# Patient Record
Sex: MALE | Race: WHITE | Employment: FULL TIME | URBAN - METROPOLITAN AREA
[De-identification: names, ages, dates, MRNs, and addresses within clinical notes are randomized per-mention and may not be internally consistent; named-entity substitution may affect disease eponyms.]

---

## 2017-01-09 ENCOUNTER — TELEPHONE (OUTPATIENT)
Dept: FAMILY MEDICINE | Facility: CLINIC | Age: 42
End: 2017-01-09

## 2017-01-09 DIAGNOSIS — Z20.828 EXPOSURE TO THE FLU: Primary | ICD-10-CM

## 2017-01-09 RX ORDER — OSELTAMIVIR PHOSPHATE 75 MG/1
75 CAPSULE ORAL DAILY
Qty: 10 CAPSULE | Refills: 0 | Status: SHIPPED | OUTPATIENT
Start: 2017-01-09 | End: 2017-02-13

## 2017-01-09 NOTE — TELEPHONE ENCOUNTER
Patient's daughter was in clinic and was diagnosed with influenza. Per Dr. Singer ok to treat with tamiflu.  Script ordered.  Rosa Lim RN

## 2017-02-08 ENCOUNTER — HOSPITAL ENCOUNTER (EMERGENCY)
Facility: CLINIC | Age: 42
Discharge: HOME | End: 2017-02-08
Attending: EMERGENCY MEDICINE | Admitting: EMERGENCY MEDICINE
Payer: COMMERCIAL

## 2017-02-08 VITALS — OXYGEN SATURATION: 98 % | DIASTOLIC BLOOD PRESSURE: 98 MMHG | SYSTOLIC BLOOD PRESSURE: 166 MMHG | TEMPERATURE: 99.8 F

## 2017-02-08 DIAGNOSIS — M79.661 RIGHT CALF PAIN: ICD-10-CM

## 2017-02-08 PROCEDURE — 99283 EMERGENCY DEPT VISIT LOW MDM: CPT | Mod: 25 | Performed by: EMERGENCY MEDICINE

## 2017-02-08 PROCEDURE — 76882 US LMTD JT/FCL EVL NVASC XTR: CPT | Performed by: EMERGENCY MEDICINE

## 2017-02-08 PROCEDURE — 76882 US LMTD JT/FCL EVL NVASC XTR: CPT | Mod: 26 | Performed by: EMERGENCY MEDICINE

## 2017-02-08 PROCEDURE — 99284 EMERGENCY DEPT VISIT MOD MDM: CPT | Mod: 25 | Performed by: EMERGENCY MEDICINE

## 2017-02-08 NOTE — ED AVS SNAPSHOT
[facility] Emergency Department    [address] [address]    Phone:  [phone]    Fax:  [phone]                                       Carlo Valdez   MRN: [id number]    Department:  [facility] Emergency Department   Date of Visit:  2/8/2017           Patient Information     Date Of Birth          1975        Your diagnoses for this visit were:     Right calf pain        You were seen by Lewis Esteves MD.        Discharge Instructions       Take acetaminophen or ibuprofen as needed for the pain.  Use rest, ice, compression, and elevation to help with the swelling.  Start range of motion exercises in the next 24-48 hours.  If your pain becomes suddenly much worse or if there is change, please return to the emergency department for recheck.  Otherwise follow-up in clinic if your pain is not improved in 5 or 6 days.    24 Hour Appointment Hotline       To make an appointment at any [facility], call [phone] ([phone]). If you don't have a family doctor or clinic, we will help you find one. [facility] clinics are conveniently located to serve the needs of you and your family.             Review of your medicines      Our records show that you are taking the medicines listed below. If these are incorrect, please call your family doctor or clinic.        Dose / Directions Last dose taken    cephALEXin 500 MG capsule   Commonly known as:  KEFLEX   Dose:  500 mg   Quantity:  40 capsule        Take 1 capsule (500 mg) by mouth 4 times daily   Refills:  0        Multi-vitamin Tabs tablet   Dose:  1 tablet   Generic drug:  multivitamin, therapeutic with minerals        Take 1 tablet by mouth daily.   Refills:  0        NO ACTIVE MEDICATIONS        .   Refills:  0        oseltamivir 75 MG capsule   Commonly known as:  TAMIFLU   Dose:  75 mg   Quantity:  10 capsule        Take 1 capsule (75 mg) by mouth daily   Refills:  0                Procedures and tests  "performed during your visit     POC US SOFT TISSUE      Orders Needing Specimen Collection     None      Pending Results     No orders found from 2017 to 2017.            Pending Culture Results     No orders found from 2017 to 2017.       Test Results from your hospital stay           2017  9:27 PM - Lewis Esteves MD      [facility] Procedure Note     Limited Bedside ED Ultrasound of Soft Tissue:    PROCEDURE: PERFORMED BY: Dr. Lewis Esteves  INDICATIONS/SYMPTOM: Calf pain  PROBE: High frequency linear probe  BODY LOCATION: Soft tissue located on right calf and achilles     FINDINGS: No defect seen within the achilles tendon of the right  lower leg  INTERPRETATION:  The soft tissue and muscle layers were  evaluated.      Findings indicate no sign of achilles tendon rupture    IMAGE DOCUMENTATION: Images were archived to PACs system.                Thank you for choosing [facility]       Thank you for choosing [facility] for your care. Our goal is always to provide you with excellent care. Hearing back from our patients is one way we can continue to improve our services. Please take a few minutes to complete the written survey that you may receive in the mail after you visit with us. Thank you!        [employer] Information     [employer] lets you send messages to your doctor, view your test results, renew your prescriptions, schedule appointments and more. To sign up, go to [email or web address] . Click on \"Log in\" on the left side of the screen, which will take you to the Welcome page. Then click on \"Sign up Now\" on the right side of the page.     You will be asked to enter the access code listed below, as well as some personal information. Please follow the directions to create your username and password.     Your access code is: [email or web address]  Expires: 2017  9:29 PM     Your access code will  in 90 days. If you need help or a new code, please call your " [facility] or [phone].        Care EveryWhere ID     This is your Care EveryWhere ID. This could be used by other organizations to access your [facility] medical records  [id number]        After Visit Summary       This is your record. Keep this with you and show to your community pharmacist(s) and doctor(s) at your next visit.

## 2017-02-08 NOTE — ED AVS SNAPSHOT
[facility] Emergency Department    [address] [address]    Phone:  [phone]    Fax:  [phone]                                       Carlo Valdez   MRN: [id number]    Department:  [facility] Emergency Department   Date of Visit:  2/8/2017           After Visit Summary Signature Page     I have received my discharge instructions, and my questions have been answered. I have discussed any challenges I see with this plan with the nurse or doctor.    ..........................................................................................................................................  Patient/Patient Representative Signature      ..........................................................................................................................................  Patient Representative Print Name and Relationship to Patient    ..................................................               ................................................  Date                                            Time    ..........................................................................................................................................  Reviewed by Signature/Title    ...................................................              ..............................................  Date                                                            Time

## 2017-02-09 NOTE — ED NOTES
"Pt presents to ED with wife. Pt complaining of right calf pain. He reports he was playing [employer] ball tonight when he was pushing forward when it felt like \"something came loose\" almost like someone hit it with a ball on the posterior side. Pt is able to bear weight with discomfort. CMS intact. Pain is worse with dorsiflexion (walking with toes pointed up). Pt has never had pain like this. Pt reports it feels like his calf muscle is on the verge of cramping up. Otherwise healthy, just got over fever Sun.   "

## 2017-02-09 NOTE — ED PROVIDER NOTES
History     Chief Complaint   Patient presents with     Leg Pain     pt was at [employer] and had pain in his rt lower leg      HPI  Carlo Valdez is a 41 year old male who presents for right calf pain.  Symptoms started suddenly just prior to arrival while he was playing volleyball.  He was running to the ball and felt a slight catch.  He has been able to walk on it since the injury. He thinks there is some swelling to the back of his calf.  No redness.  He has not taken anything for the symptoms.  Pain feels like aching, rated as moderate.  He denies fever, head injury, nausea, vomiting, or rash.    Previously healthy  No daily medications  Allergies include penicillin  Does not smoke, drinks alcohol occasionally, denies illicit drug use    I have reviewed the Medications, Allergies, Past Medical and Surgical History, and Social History in the Epic system.    Review of Systems  Pertinent positives and negatives listed in the HPI, all other systems reviewed and are negative.    Physical Exam   BP: (!) 166/98 mmHg  Heart Rate: 79  Temp: 99.8  F (37.7  C)  SpO2: 98 %  Physical Exam   Constitutional: He is oriented to person, place, and time. He appears well-developed and well-nourished. No distress.   HENT:   Head: Normocephalic and atraumatic.   Eyes: No scleral icterus.   Neck: Normal range of motion. Neck supple.   Musculoskeletal:   Right Knee: no deformity, effusion, erythema or warmth appreciated; no tenderness over patella, joint line, or femoral condyles; full ROM.  Right Calf: No erythema, excess warmth, slight tenderness.  Right Ankle: no deformity, erythema, or warmth appreciated; no tenderness over medial or lateral malleoli, base of 5th metatarsal, navicular, or ankle syndesmosis; full ankle ROM; negative Neal squeeze test; full strength to dorsiflexion, plantar flexion, eversion and inversion.   Neurological: He is alert and oriented to person, place, and time.   Skin: Skin is warm and dry. No  rash noted. He is not diaphoretic. No erythema. No pallor.       ED Course   Procedures  Results for orders placed during the hospital encounter of 02/08/17   POC US SOFT TISSUE    Impression [facility] Procedure Note     Limited Bedside ED Ultrasound of Soft Tissue:    PROCEDURE: PERFORMED BY: Dr. Lewis Esteves  INDICATIONS/SYMPTOM: Calf pain  PROBE: High frequency linear probe  BODY LOCATION: Soft tissue located on right calf and achilles     FINDINGS: No defect seen within the achilles tendon of the right  lower leg  INTERPRETATION:  The soft tissue and muscle layers were  evaluated.      Findings indicate no sign of achilles tendon rupture    IMAGE DOCUMENTATION: Images were archived to PACs system.             Critical Care time:  none               Labs Ordered and Resulted from Time of ED Arrival Up to the Time of Departure from the ED - No data to display    Assessments & Plan (with Medical Decision Making)   41-year-old male presents for right calf pain.  Differential includes fracture, dislocation, soft tissue injury.  Using the [place] knee and ankle rules, no indication for imaging of the knee or ankle this time, low risk for fracture.  No defect felt within the Achilles tendon.  Ultrasound of the Achilles tendon does not show any defect.  Likely strain, discharged with instructions to use acetaminophen or ibuprofen as needed for symptoms, rest, ice, compression, elevation, return if worse, otherwise follow up in clinic.  The patient is in agreement with this plan.    I have reviewed the nursing notes.    I have reviewed the findings, diagnosis, plan and need for follow up with the patient.    Current Discharge Medication List          Final diagnoses:   Right calf pain       2/8/2017   [facility] EMERGENCY DEPARTMENT      Lewis Esteves MD  02/08/17 [id number]

## 2017-02-09 NOTE — ED NOTES
Was at volleyball and though his leg was hit by the ball, not that was not what happened, he is having lower rt leg pain and swelling,

## 2017-02-09 NOTE — DISCHARGE INSTRUCTIONS
Take acetaminophen or ibuprofen as needed for the pain.  Use rest, ice, compression, and elevation to help with the swelling.  Start range of motion exercises in the next 24-48 hours.  If your pain becomes suddenly much worse or if there is change, please return to the emergency department for recheck.  Otherwise follow-up in clinic if your pain is not improved in 5 or 6 days.

## 2017-02-13 ENCOUNTER — OFFICE VISIT (OUTPATIENT)
Dept: FAMILY MEDICINE | Facility: CLINIC | Age: 42
End: 2017-02-13
Payer: COMMERCIAL

## 2017-02-13 VITALS
HEIGHT: 72 IN | SYSTOLIC BLOOD PRESSURE: 136 MMHG | DIASTOLIC BLOOD PRESSURE: 86 MMHG | WEIGHT: 259.4 LBS | HEART RATE: 72 BPM | TEMPERATURE: 97 F | BODY MASS INDEX: 35.13 KG/M2

## 2017-02-13 DIAGNOSIS — M79.661 RIGHT CALF PAIN: Primary | ICD-10-CM

## 2017-02-13 PROCEDURE — 99213 OFFICE O/P EST LOW 20 MIN: CPT | Performed by: NURSE PRACTITIONER

## 2017-02-13 NOTE — NURSING NOTE
"Chief Complaint   Patient presents with     Hospital F/U       Initial /86 (BP Location: Left arm, Patient Position: Chair, Cuff Size: Adult Large)  Pulse 72  Temp 97  F (36.1  C) (Tympanic)  Ht 5' 11.75\" (1.822 m)  Wt 259 lb 6.4 oz (117.7 kg)  BMI 35.43 kg/m2 Estimated body mass index is 35.43 kg/(m^2) as calculated from the following:    Height as of this encounter: 5' 11.75\" (1.822 m).    Weight as of this encounter: 259 lb 6.4 oz (117.7 kg).  Medication Reconciliation: complete     Aida Hubbard CMA (AAMA)      "

## 2017-02-13 NOTE — PATIENT INSTRUCTIONS
Continue to keep the leg elevate  the leg when you can   try applying heat to the leg for  20 imin 2-3 times per day    Wear an ace wrap to the lower leg.  Applying from the foot up.  Off during hours sleep    Monitor the swelling      Continue the  Ibuprofen  600 mg three time per day for  7-10 days ( total)     The leg measurments.  12 cm from the back of the knee     Left leg was  43 cm  Right leg  48.5 cm

## 2017-02-13 NOTE — MR AVS SNAPSHOT
"              After Visit Summary   2/13/2017    Carlo Valdez    MRN: [id number]           Patient Information     Date Of Birth          1975        Visit Information        Provider Department      2/13/2017 9:40 AM Zee Oneill APRN [facility] Instructions    Continue to keep the leg elevate  the leg when you can   try applying heat to the leg for  20 imin 2-3 times per day    Wear an ace wrap to the lower leg.  Applying from the foot up.  Off during hours sleep    Monitor the swelling      Continue the  Ibuprofen  600 mg three time per day for  7-10 days ( total)     The leg measurments.  12 cm from the back of the knee     Left leg was  43 cm  Right leg  48.5 cm          Follow-ups after your visit        Who to contact     Normal or non-critical lab and imaging results will be communicated to you by [employer], letter or phone within 4 business days after the clinic has received the results. If you do not hear from us within 7 days, please contact the clinic through MyChart or phone. If you have a critical or abnormal lab result, we will notify you by phone as soon as possible.  Submit refill requests through [employer] or call your pharmacy and they will forward the refill request to us. Please allow 3 business days for your refill to be completed.          If you need to speak with a  for additional information , please call: [phone]           Additional Information About Your Visit        [employer] Information     [employer] lets you send messages to your doctor, view your test results, renew your prescriptions, schedule appointments and more. To sign up, go to [email or web address] . Click on \"Log in\" on the left side of the screen, which will take you to the Welcome page. Then click on \"Sign up Now\" on the right side of the page.     You will be asked to enter the access code listed below, as well as some personal information. Please " "follow the directions to create your username and password.     Your access code is: [email or web address]  Expires: 2017  9:29 PM     Your access code will  in 90 days. If you need help or a new code, please call your [place] clinic or [phone].        Care EveryWhere ID     This is your Care EveryWhere ID. This could be used by other organizations to access your [place] medical records  [id number]        Your Vitals Were     Pulse Temperature Height BMI (Body Mass Index)          72 97  F (36.1  C) (Tympanic) 5' 11.75\" (1.822 m) 35.43 kg/m2         Blood Pressure from Last 3 Encounters:   17 136/86   17 (!) 166/98   16 (!) 150/100    Weight from Last 3 Encounters:   17 259 lb 6.4 oz (117.7 kg)   16 265 lb 9.6 oz (120.5 kg)   16 263 lb 14.4 oz (119.7 kg)              Today, you had the following     No orders found for display         Today's Medication Changes          These changes are accurate as of: 17 10:25 AM.  If you have any questions, ask your nurse or doctor.               Stop taking these medicines if you haven't already. Please contact your care team if you have questions.     cephALEXin 500 MG capsule   Commonly known as:  KEFLEX   Stopped by:  Zee Oneill APRN CNP           oseltamivir 75 MG capsule   Commonly known as:  TAMIFLU   Stopped by:  Zee Oneill APRN CNP                    Primary Care Provider Office Phone # Fax #    REINA Nixon [phone]       [facility] [address] [place] [address]        Thank you!     Thank you for choosing [facility]  for your care. Our goal is always to provide you with excellent care. Hearing back from our patients is one way we can continue to improve our services. Please take a few minutes to complete the written survey that you may receive in the mail after your visit with us. Thank you!             Your Updated " Medication List - Protect others around you: Learn how to safely use, store and throw away your medicines at www.disposemymeds.org.          This list is accurate as of: 2/13/17 10:25 AM.  Always use your most recent med list.                   Brand Name Dispense Instructions for use    Multi-vitamin Tabs tablet   Generic drug:  multivitamin, therapeutic with minerals      Take 1 tablet by mouth daily Reported on 2/13/2017       NO ACTIVE MEDICATIONS      Reported on 2/13/2017

## 2017-02-13 NOTE — PROGRESS NOTES
SUBJECTIVE:                                                    Carlo Valdez is a 41 year old male who presents to clinic today for the following health issues:      ED/UC Followup:    Facility:  [facility]  Date of visit: 2/8/17  Reason for visit: Right Calf Pain  Current Status: Was playing volleyball and felt something stretch, causing immediate pain. The pain has since gotten better, it will still hurt if he moves wrong, but the swelling is still present and has not changed much.        The pain in the right calf is better,  There is still swelling,  This has not changed much    Problem list and histories reviewed & adjusted, as indicated.  Additional history: as documented    Patient Active Problem List   Diagnosis     CARDIOVASCULAR SCREENING; LDL GOAL LESS THAN 160     Health Care Home     Elevated blood pressure reading without diagnosis of hypertension     Past Surgical History   Procedure Laterality Date     Surgical history of -   2001     removal of lymph node from left groin       Social History   Substance Use Topics     Smoking status: Never Smoker     Smokeless tobacco: Never Used     Alcohol use 0.0 oz/week     0 Standard drinks or equivalent per week      Comment: 1-2 drinks per month     Family History   Problem Relation Age of Onset     Neurologic Disorder Mother      parkinsons     C.A.D. Father      MI age 71-passed from MI         Current Outpatient Prescriptions   Medication Sig Dispense Refill     NO ACTIVE MEDICATIONS Reported on 2/13/2017       Multiple Vitamin (MULTI-VITAMIN) per tablet Take 1 tablet by mouth daily Reported on 2/13/2017       Allergies   Allergen Reactions     Penicillins      BP Readings from Last 3 Encounters:   02/13/17 136/86   02/08/17 (!) 166/98   05/23/16 (!) 150/100    Wt Readings from Last 3 Encounters:   02/13/17 259 lb 6.4 oz (117.7 kg)   05/23/16 265 lb 9.6 oz (120.5 kg)   05/13/16 263 lb 14.4 oz (119.7 kg)                    ROS:  C: NEGATIVE for  "fever, chills, change in weight  E/M: NEGATIVE for ear, mouth and throat problems  R: NEGATIVE for significant cough or SOB  CV: NEGATIVE for chest pain, palpitations or peripheral edema  MUSCULOSKELETAL: POSITIVE  for right leg pain.  While he was playing volleyball.  Felt like some one hit him with a football.  Was having to slide the foot rather than lifting the foot.    Over the week-end he was elevating the let.      OBJECTIVE:                                                    /86 (BP Location: Left arm, Patient Position: Chair, Cuff Size: Adult Large)  Pulse 72  Temp 97  F (36.1  C) (Tympanic)  Ht 5' 11.75\" (1.822 m)  Wt 259 lb 6.4 oz (117.7 kg)  BMI 35.43 kg/m2  Body mass index is 35.43 kg/(m^2).  GENERAL: healthy, alert and no distress  RESP: lungs clear to auscultation - no rales, rhonchi or wheezes  CV: regular rate and rhythm, normal S1 S2, no S3 or S4, no murmur, click or rub, no peripheral edema and peripheral pulses strong    MS: right lower leg.     Left calf is  43  Cm  And the right calf   Is 48.5 cm   This is 12 cm down from the back of the knee   Is able to raise up on to toe ,  Is able to  Flex and extend the foot.    No bruising ,    Foot will raise with squeezing the calf.   Is able to walk  Without a limp ,     Diagnostic Test Results:  Results for orders placed or performed during the hospital encounter of 02/08/17   POC US SOFT TISSUE    Impression    [facility] Procedure Note     Limited Bedside ED Ultrasound of Soft Tissue:    PROCEDURE: PERFORMED BY: Dr. Lewis Esteves  INDICATIONS/SYMPTOM: Calf pain  PROBE: High frequency linear probe  BODY LOCATION: Soft tissue located on right calf and achilles     FINDINGS: No defect seen within the achilles tendon of the right  lower leg  INTERPRETATION:  The soft tissue and muscle layers were  evaluated.      Findings indicate no sign of achilles tendon rupture    IMAGE DOCUMENTATION: Images were archived to PACs system.   43 cm  "  The ad     ASSESSMENT/PLAN:                                                         ASSESSMENT/PLAN:      ICD-10-CM    1. Right calf pain M79.661        Patient Instructions   Continue to keep the leg elevate  the leg when you can   try applying heat to the leg for  20 imin 2-3 times per day    Wear an ace wrap to the lower leg.  Applying from the foot up.  Off during hours sleep    Monitor the swelling      Continue the  Ibuprofen  600 mg three time per day for  7-10 days ( total)     The leg measurments.  12 cm from the back of the knee     Left leg was  43 cm  Right leg  48.5 cm                  See Patient Instructions    AFTAB ROSSI NP, APRN CNP  [facility]

## 2017-02-14 ENCOUNTER — TELEPHONE (OUTPATIENT)
Dept: NURSING | Facility: CLINIC | Age: 42
End: 2017-02-14

## 2017-02-14 ENCOUNTER — HOSPITAL ENCOUNTER (EMERGENCY)
Facility: CLINIC | Age: 42
Discharge: HOME | End: 2017-02-14
Attending: NURSE PRACTITIONER | Admitting: NURSE PRACTITIONER
Payer: COMMERCIAL

## 2017-02-14 VITALS — OXYGEN SATURATION: 97 % | DIASTOLIC BLOOD PRESSURE: 93 MMHG | SYSTOLIC BLOOD PRESSURE: 150 MMHG | TEMPERATURE: 97.4 F

## 2017-02-14 DIAGNOSIS — S86.111D: ICD-10-CM

## 2017-02-14 PROCEDURE — 99212 OFFICE O/P EST SF 10 MIN: CPT

## 2017-02-14 PROCEDURE — 99213 OFFICE O/P EST LOW 20 MIN: CPT | Performed by: NURSE PRACTITIONER

## 2017-02-14 ASSESSMENT — ENCOUNTER SYMPTOMS
NEUROLOGICAL NEGATIVE: 1
EYES NEGATIVE: 1
GASTROINTESTINAL NEGATIVE: 1
RESPIRATORY NEGATIVE: 1
MUSCULOSKELETAL NEGATIVE: 1
CARDIOVASCULAR NEGATIVE: 1
CONSTITUTIONAL NEGATIVE: 1

## 2017-02-14 NOTE — TELEPHONE ENCOUNTER
"Call Type: Triage Call    Presenting Problem: \"My 's right, lower leg is swollen and  bruised.\"  was playing volleyball, last Wednesday (2/8/17),  and felt that a heavy ball hit his lower, right leg. Wife says that  this did not happen, though. Pt. saw a NP yesterday, and had an  ultrasound done. Wife says that the ultrasound did not show  anything.  has had his right, lower leg wrapped, as  instructed.  Triage Note:  Guideline Title: Leg Non-Injury  Recommended Disposition: See ED Immediately  Original Inclination: Wanted to speak with a nurse  Override Disposition:  Intended Action: Go to Hospital / ED  Physician Contacted: No  Extremity swelling or limitation of range of motion AND known bleeding disorder OR  taking blood thinner, chemotherapy or transplant medications ?  YES  Orthopedic hardware (metal plate, scar or screw) newly bulging under or through  skin ? NO  Gradual onset or worsening weakness/paralysis OR inability to purposely move ? NO  Gradual onset or worsening numbness/tingling ? NO  Generalized muscle pain or aching ? NO  Painful spasms or cramping of large muscle groups (back, legs or abdomen) AND  recent heat exposure ? NO  New onset of severe pain AND change in sensation (numb, tingling, or no feeling),  change in color (pale or blue), feels cool to touch compared to other extremity.  ? NO  New onset of inability to take unassisted weight-bearing steps ? NO  Sudden onset of shortness of breath, chest pain and cough with blood tinged sputum  ? NO  New, painful cord-like swelling in calf or thigh of the leg; cord-like swelling  may feel warm or look red or discolored. ? NO  New onset of unbearable pain within last 24 hours ? NO  New unexplained weakness/paralysis, change in sensation (numbness or tingling) or  inability to purposely move, especially when one side of body is involved  occurring now or within last 4 hours ? NO  Physician Instructions:  Care Advice: Another adult " should drive.  Do not take NSAIDs (non-steroidal anti-inflammatory medications) such as  aspirin, ibuprofen and naproxen.  IMMEDIATE ACTION  Write down provider's name. List or place the following in a bag for  transport with the patient: current prescription and/or nonprescription  medications  alternative treatments, therapies and medications  and street drugs.  Support part in position of comfort to reduce pain and swelling  avoid unnecessary movement.  Apply a cloth-covered ice pack, cold gel pack or cold compress to the  affected area to reduce pain and swelling  do not delay transport.

## 2017-02-14 NOTE — ED AVS SNAPSHOT
[facility] Emergency Department    [address] [address]    Phone:  [phone]    Fax:  [phone]                                       Carlo Valdez   MRN: [id number]    Department:  [facility] Emergency Department   Date of Visit:  2/14/2017           Patient Information     Date Of Birth          1975        Your diagnoses for this visit were:     Strain of soleus muscle, right, subsequent encounter        You were seen by Enrique Miles, APRN CNP.      Follow-up Information     Follow up with Zee Oneill, APRN CNP.    Specialty:  Family Practice    Why:  As needed, If symptoms worsen    Contact information:    [facility]  [address]   [place] [address]  [phone]          Discharge Instructions         Muscle Strain in the Extremities  A muscle strain is a stretching and tearing of muscle fibers. This causes pain, especially when you move that muscle. There may also be some swelling and bruising.  Home care    Keep the hurt area raised to reduce pain and swelling. This is especially important during the first 48 hours.    Apply an ice pack over the injured area for 15 to 20 minutes every 3 to 6 hours. You should do this for the first 24 to 48 hours. You can make an ice pack by filling a plastic bag that seals at the top with ice cubes and then wrapping it with a thin towel. Be careful not to injure your skin with the ice treatments. Ice should never be applied directly to skin. Continue the use of ice packs for relief of pain and swelling as needed. After 48 hours, apply heat (warm shower or warm bath) for 15 to 20 minutes several times a day, or alternate ice and heat.    You may use over-the-counter pain medicine to control pain, unless another medicine was prescribed. If you have chronic liver or kidney disease or ever had a stomach ulcer or GI bleeding, talk with your healthcare provider before using these  medicines.    For leg strains: If crutches have been recommended, don t put full weight on the hurt leg until you can do so without pain. You can return to sports when you are able to hop and run on the injured leg without pain.  Follow-up care  Follow up with your healthcare provider, or as advised.  When to seek medical advice  Call your healthcare provider right away if any of these occur:    The toes of the injured leg become swollen, cold, blue, numb, or tingly    Pain or swelling increases    4718-6809 [employer] [address] [place] [address]. All rights reserved. This information is not intended as a substitute for professional medical care. Always follow your healthcare professional's instructions.          24 Hour Appointment Hotline       To make an appointment at any [facility] clinic, call [phone] ([phone]). If you don't have a family doctor or clinic, we will help you find one. [facility] clinics are conveniently located to serve the needs of you and your family.          ED Discharge Orders     ORTHO  REFERRAL       [facility] is referring you to the Orthopedic  Services at [facility].       The  Representative will assist you in the coordination of your Orthopedic and Musculoskeletal Care as prescribed by your physician.    The  Representative will call you within 1 business day to help schedule your appointment, or you may contact the  Representative at:    All areas ~ [phone]     Type of Referral : Non Surgical       Timeframe requested: Routine    Coverage of these services is subject to the terms and limitations of your health insurance plan.  Please call member services at your health plan with any benefit or coverage questions.      If X-rays, CT or MRI's have been performed, please contact the facility where they were done to arrange for , prior to your  "scheduled appointment.  Please bring this referral request to your appointment and present it to your specialist.                     Review of your medicines      Our records show that you are taking the medicines listed below. If these are incorrect, please call your family doctor or clinic.        Dose / Directions Last dose taken    Multi-vitamin Tabs tablet   Dose:  1 tablet   Generic drug:  multivitamin, therapeutic with minerals        Take 1 tablet by mouth daily Reported on 2017   Refills:  0        NO ACTIVE MEDICATIONS        Reported on 2017   Refills:  0                Orders Needing Specimen Collection     None      Pending Results     No orders found from 2017 to 2/15/2017.            Pending Culture Results     No orders found from 2017 to 2/15/2017.             Test Results from your hospital stay            Thank you for choosing [facility]       Thank you for choosing [facility] for your care. Our goal is always to provide you with excellent care. Hearing back from our patients is one way we can continue to improve our services. Please take a few minutes to complete the written survey that you may receive in the mail after you visit with us. Thank you!        [employer] Information     [employer] lets you send messages to your doctor, view your test results, renew your prescriptions, schedule appointments and more. To sign up, go to [email or web address] . Click on \"Log in\" on the left side of the screen, which will take you to the Welcome page. Then click on \"Sign up Now\" on the right side of the page.     You will be asked to enter the access code listed below, as well as some personal information. Please follow the directions to create your username and password.     Your access code is: [email or web address]  Expires: 2017  9:29 PM     Your access code will  in 90 days. If you need help or a new code, please call your [facility] clinic or [phone].        Care EveryWhere ID     " This is your Care EveryWhere ID. This could be used by other organizations to access your [facility] medical records  [id number]        After Visit Summary       This is your record. Keep this with you and show to your community pharmacist(s) and doctor(s) at your next visit.

## 2017-02-14 NOTE — ED AVS SNAPSHOT
[facility] Emergency Department    [address] [address]    Phone:  [phone]    Fax:  [phone]                                       Carlo Valdez   MRN: [id number]    Department:  [facility] Emergency Department   Date of Visit:  2/14/2017           After Visit Summary Signature Page     I have received my discharge instructions, and my questions have been answered. I have discussed any challenges I see with this plan with the nurse or doctor.    ..........................................................................................................................................  Patient/Patient Representative Signature      ..........................................................................................................................................  Patient Representative Print Name and Relationship to Patient    ..................................................               ................................................  Date                                            Time    ..........................................................................................................................................  Reviewed by Signature/Title    ...................................................              ..............................................  Date                                                            Time

## 2017-02-15 NOTE — ED NOTES
had injued his RLL last wk, was seen again yesterday for the swelling and was told to wrap with ace, now it seems to be settling at his ankle/foot

## 2017-02-15 NOTE — ED PROVIDER NOTES
History     Chief Complaint   Patient presents with     Leg Swelling     had injued his RLL last wk, was seen again yesterday for the swelling and was told to wrap with ace     HPI  Carlo Valdez is a 41 year old male who injured his right calf while playing volleyball last week. He had immediate pain but no loss of function. The calf swelled up and he came to  last week. An US was done which was benign. He is able to walk on it well. He came again yesterday and given the ongoing swelling, was told to apply ace wraps and elevate; however, this led to him noticing some eccymosis on the inside heel this morning despite not having any new symptoms. He did exercise on the exercise bike last evening to try to work out the swelling. Other than this, he has no new symptoms.     I have reviewed the Medications, Allergies, Past Medical and Surgical History, and Social History in the Epic system.    Review of Systems   Constitutional: Negative.    HENT: Negative.    Eyes: Negative.    Respiratory: Negative.    Cardiovascular: Negative.    Gastrointestinal: Negative.    Genitourinary: Negative.    Musculoskeletal: Negative.    Skin: Negative.    Neurological: Negative.        Physical Exam   BP: (!) 150/93  Heart Rate: 85  Temp: 97.4  F (36.3  C)  SpO2: 97 %  Physical Exam   Constitutional: He is oriented to person, place, and time. He appears well-developed. No distress.   Eyes: Conjunctivae are normal.   Pulmonary/Chest: Effort normal.   Musculoskeletal: Normal range of motion. He exhibits edema. He exhibits no tenderness or deformity.   No bony tenderness, negative wendy sign. Normal CMS.   Neurological: He is alert and oriented to person, place, and time. He exhibits normal muscle tone. Coordination normal.   Skin: Skin is warm. No rash noted.       ED Course     ED Course     Procedures               Labs Ordered and Resulted from Time of ED Arrival Up to the Time of Departure from the ED - No data to  display    Assessments & Plan (with Medical Decision Making)   Likely calf strain    I have reviewed the nursing notes.    I have reviewed the findings, diagnosis, plan and need for follow up with the patient.  Discussed resting the legs now other than walking around but not doing exercise at the gym until this has improved. We discussed an US looking for blood clot but given that the swelling occurred in response to trauma and is otherwise, besides swelling, is mostly asymptomatic. I recommended f/u with sports medicine for ongoing issues but to come to the ED if he experiences anything dramatic in terms of pain or function.     New Prescriptions    No medications on file       Final diagnoses:   Strain of soleus muscle, right, subsequent encounter       2/14/2017   [facility] EMERGENCY DEPARTMENT     Enrique Miles, REINA CNP  02/14/17 [id number]

## 2017-02-15 NOTE — DISCHARGE INSTRUCTIONS
Muscle Strain in the Extremities  A muscle strain is a stretching and tearing of muscle fibers. This causes pain, especially when you move that muscle. There may also be some swelling and bruising.  Home care    Keep the hurt area raised to reduce pain and swelling. This is especially important during the first 48 hours.    Apply an ice pack over the injured area for 15 to 20 minutes every 3 to 6 hours. You should do this for the first 24 to 48 hours. You can make an ice pack by filling a plastic bag that seals at the top with ice cubes and then wrapping it with a thin towel. Be careful not to injure your skin with the ice treatments. Ice should never be applied directly to skin. Continue the use of ice packs for relief of pain and swelling as needed. After 48 hours, apply heat (warm shower or warm bath) for 15 to 20 minutes several times a day, or alternate ice and heat.    You may use over-the-counter pain medicine to control pain, unless another medicine was prescribed. If you have chronic liver or kidney disease or ever had a stomach ulcer or GI bleeding, talk with your healthcare provider before using these medicines.    For leg strains: If crutches have been recommended, don t put full weight on the hurt leg until you can do so without pain. You can return to sports when you are able to hop and run on the injured leg without pain.  Follow-up care  Follow up with your healthcare provider, or as advised.  When to seek medical advice  Call your healthcare provider right away if any of these occur:    The toes of the injured leg become swollen, cold, blue, numb, or tingly    Pain or swelling increases    7763-9091 [employer]. [address] [place] [address], [place] [address]. All rights reserved. This information is not intended as a substitute for professional medical care. Always follow your healthcare professional's instructions.

## 2018-09-21 ENCOUNTER — OFFICE VISIT (OUTPATIENT)
Dept: FAMILY MEDICINE | Facility: CLINIC | Age: 43
End: 2018-09-21
Payer: COMMERCIAL

## 2018-09-21 VITALS
HEIGHT: 72 IN | DIASTOLIC BLOOD PRESSURE: 110 MMHG | SYSTOLIC BLOOD PRESSURE: 148 MMHG | HEART RATE: 85 BPM | WEIGHT: 262 LBS | OXYGEN SATURATION: 97 % | TEMPERATURE: 97.4 F | BODY MASS INDEX: 35.49 KG/M2

## 2018-09-21 DIAGNOSIS — I10 HTN, GOAL BELOW 140/90: ICD-10-CM

## 2018-09-21 DIAGNOSIS — J03.90 TONSILLITIS: Primary | ICD-10-CM

## 2018-09-21 PROCEDURE — 87070 CULTURE OTHR SPECIMN AEROBIC: CPT | Performed by: PHYSICIAN ASSISTANT

## 2018-09-21 PROCEDURE — 99213 OFFICE O/P EST LOW 20 MIN: CPT | Performed by: PHYSICIAN ASSISTANT

## 2018-09-21 RX ORDER — CETIRIZINE HYDROCHLORIDE 10 MG/1
10 TABLET ORAL EVERY EVENING
Qty: 30 TABLET | Refills: 1 | Status: SHIPPED | OUTPATIENT
Start: 2018-09-21 | End: 2019-12-09

## 2018-09-21 NOTE — MR AVS SNAPSHOT
"              After Visit Summary   2018    Carlo Valdez    MRN: [id number]           Patient Information     Date Of Birth          1975        Visit Information        Provider Department      2018 10:20 AM Ana Rivas PA-C [facility]        Today's Diagnoses     Tonsillitis    -  1    HTN, goal below 140/90           Follow-ups after your visit        Who to contact     Normal or non-critical lab and imaging results will be communicated to you by Latiohart, letter or phone within 4 business days after the clinic has received the results. If you do not hear from us within 7 days, please contact the clinic through Latiohart or phone. If you have a critical or abnormal lab result, we will notify you by phone as soon as possible.  Submit refill requests through [employer] or call your pharmacy and they will forward the refill request to us. Please allow 3 business days for your refill to be completed.          If you need to speak with a  for additional information , please call: [phone]             Additional Information About Your Visit        [employer] Information     [employer] lets you send messages to your doctor, view your test results, renew your prescriptions, schedule appointments and more. To sign up, go to [email or web address] . Click on \"Log in\" on the left side of the screen, which will take you to the Welcome page. Then click on \"Sign up Now\" on the right side of the page.     You will be asked to enter the access code listed below, as well as some personal information. Please follow the directions to create your username and password.     Your access code is: [email or web address]  Expires: 2018 10:53 AM     Your access code will  in 90 days. If you need help or a new code, please call your [facility] or [phone].        Care EveryWhere ID     This is your Care EveryWhere ID. This could be used by other organizations to access your " "[facility] medical records  [id number]        Your Vitals Were     Pulse Temperature Height Pulse Oximetry BMI (Body Mass Index)       85 97.4  F (36.3  C) (Tympanic) 5' 11.75\" (1.822 m) 97% 35.78 kg/m2        Blood Pressure from Last 3 Encounters:   09/21/18 (!) 148/110   02/14/17 (!) 150/93   02/13/17 136/86    Weight from Last 3 Encounters:   09/21/18 262 lb (118.8 kg)   02/13/17 259 lb 6.4 oz (117.7 kg)   05/23/16 265 lb 9.6 oz (120.5 kg)              We Performed the Following     Contact patient for PGEN study     Throat Culture Aerobic Bacterial          Today's Medication Changes          These changes are accurate as of 9/21/18 10:53 AM.  If you have any questions, ask your nurse or doctor.               Start taking these medicines.        Dose/Directions    cetirizine 10 MG tablet   Commonly known as:  zyrTEC   Used for:  Tonsillitis   Started by:  Ana Rivas PA-C        Dose:  10 mg   Take 1 tablet (10 mg) by mouth every evening   Quantity:  30 tablet   Refills:  1            Where to get your medicines      These medications were sent to [facility] [address] [address]     Phone:  [phone]     cetirizine 10 MG tablet                Primary Care Provider Office Phone # Fax #    Zee Oneill, APRN [facility] [phone]       [address] [place] [address]        Equal Access to Services     DENAE CRONIN AH: Hadii aad ku hadasho Soalejandro, waaxda luqadaha, qaybta kaalmada adeegyada, gaviota de luna. So [facility] [phone].    ATENCIÓN: Si habla español, tiene a caceres disposición servicios gratuitos de asistencia lingüística. Llame al [phone].    We comply with applicable federal civil rights laws and [place] laws. We do not discriminate on the basis of race, color, national origin, age, disability, sex, sexual orientation, or gender identity.            Thank you!  "    Thank you for choosing [facility]  for your care. Our goal is always to provide you with excellent care. Hearing back from our patients is one way we can continue to improve our services. Please take a few minutes to complete the written survey that you may receive in the mail after your visit with us. Thank you!             Your Updated Medication List - Protect others around you: Learn how to safely use, store and throw away your medicines at www.disposemymeds.org.          This list is accurate as of 9/21/18 10:53 AM.  Always use your most recent med list.                   Brand Name Dispense Instructions for use Diagnosis    cetirizine 10 MG tablet    zyrTEC    30 tablet    Take 1 tablet (10 mg) by mouth every evening    Tonsillitis

## 2018-09-21 NOTE — PROGRESS NOTES
"  SUBJECTIVE:   Carlo Valdez is a 42 year old male who presents to clinic today for the following health issues:    ENT Symptoms             Symptoms: cc Present Absent Comment   Fever/Chills   x    Fatigue   x    Muscle Aches   x    Eye Irritation   x    Sneezing   x    Nasal Chetan/Drg   x    Sinus Pressure/Pain   x    Loss of smell   x    Dental pain   x    Sore Throat  x     Swollen Glands  x  Right side has been swollen for over a month    Ear Pain/Fullness   x    Cough   x    Wheeze   x    Chest Pain  x  Feel heavy    Shortness of breath   x    Rash   x    Other  x  constipation symptoms      Symptom duration:  over a month    Symptom severity:  moderate    Treatments tried:  benadryl   Contacts:  none       Problem list and histories reviewed & adjusted, as indicated.  Additional history: Patient notes that he hasn't been taking care of his HTN because he usually just goes to the gym.  Reviewed care of HTN and discussed PGen protocol.  He is amenable to a referral.     ROS:  Constitutional, HEENT, cardiovascular, pulmonary, gi and gu systems are negative, except as otherwise noted.    OBJECTIVE:     BP (!) 148/110  Pulse 85  Temp 97.4  F (36.3  C) (Tympanic)  Ht 5' 11.75\" (1.822 m)  Wt 262 lb (118.8 kg)  SpO2 97%  BMI 35.78 kg/m2  Body mass index is 35.78 kg/(m^2).  GENERAL: healthy, alert and no distress  HENT: normal cephalic/atraumatic, ear canals and TM's normal, nose and mouth without ulcers or lesions, oropharynx clear, oral mucous membranes moist, tonsillar hypertrophy R at +2, L +1 with no erythema or suppuration noted.    NECK: no adenopathy, no asymmetry, masses, or scars and thyroid normal to palpation    Diagnostic Test Results:  none     ASSESSMENT/PLAN:   1. Tonsillitis  - Throat Culture Aerobic Bacterial  - cetirizine (ZYRTEC) 10 MG tablet; Take 1 tablet (10 mg) by mouth every evening  Dispense: 30 tablet; Refill: 1    2. HTN, goal below 140/90  - Contact patient for PGEN study    Use " medication as directed.  Follow up if symptoms should persist, change or worsen.  Follow up per PGen.  Patient amenable to this follow up plan.     Ana Rivas PA-C  [facility]

## 2018-09-23 LAB
BACTERIA SPEC CULT: NORMAL
SPECIMEN SOURCE: NORMAL

## 2018-09-24 ENCOUNTER — TELEPHONE (OUTPATIENT)
Dept: NURSING | Facility: CLINIC | Age: 43
End: 2018-09-24

## 2018-09-24 NOTE — TELEPHONE ENCOUNTER
Spoke with patient about Pgen study. He is interested but unsure at this time , he will call back when and if  he decides if he will participate.

## 2018-10-23 ENCOUNTER — HOSPITAL ENCOUNTER (EMERGENCY)
Facility: CLINIC | Age: 43
Discharge: HOME | End: 2018-10-23
Attending: PHYSICIAN ASSISTANT | Admitting: PHYSICIAN ASSISTANT
Payer: COMMERCIAL

## 2018-10-23 VITALS
SYSTOLIC BLOOD PRESSURE: 168 MMHG | HEIGHT: 72 IN | DIASTOLIC BLOOD PRESSURE: 102 MMHG | OXYGEN SATURATION: 98 % | RESPIRATION RATE: 18 BRPM | WEIGHT: 263 LBS | BODY MASS INDEX: 35.62 KG/M2 | TEMPERATURE: 97.8 F

## 2018-10-23 DIAGNOSIS — R07.0 THROAT PAIN: ICD-10-CM

## 2018-10-23 LAB
BASOPHILS # BLD AUTO: 0 10E9/L (ref 0–0.2)
BASOPHILS NFR BLD AUTO: 0.5 %
DIFFERENTIAL METHOD BLD: NORMAL
EOSINOPHIL # BLD AUTO: 0.1 10E9/L (ref 0–0.7)
EOSINOPHIL NFR BLD AUTO: 1.5 %
ERYTHROCYTE [DISTWIDTH] IN BLOOD BY AUTOMATED COUNT: 12.1 % (ref 10–15)
HCT VFR BLD AUTO: 46.1 % (ref 40–53)
HETEROPH AB SER QL: NEGATIVE
HGB BLD-MCNC: 15.3 G/DL (ref 13.3–17.7)
IMM GRANULOCYTES # BLD: 0 10E9/L (ref 0–0.4)
IMM GRANULOCYTES NFR BLD: 0.2 %
INTERNAL QC OK POCT: YES
LYMPHOCYTES # BLD AUTO: 2.6 10E9/L (ref 0.8–5.3)
LYMPHOCYTES NFR BLD AUTO: 32.4 %
MCH RBC QN AUTO: 30.4 PG (ref 26.5–33)
MCHC RBC AUTO-ENTMCNC: 33.2 G/DL (ref 31.5–36.5)
MCV RBC AUTO: 92 FL (ref 78–100)
MONOCYTES # BLD AUTO: 0.6 10E9/L (ref 0–1.3)
MONOCYTES NFR BLD AUTO: 7.9 %
NEUTROPHILS # BLD AUTO: 4.7 10E9/L (ref 1.6–8.3)
NEUTROPHILS NFR BLD AUTO: 57.5 %
NRBC # BLD AUTO: 0 10*3/UL
NRBC BLD AUTO-RTO: 0 /100
PLATELET # BLD AUTO: 208 10E9/L (ref 150–450)
RBC # BLD AUTO: 5.04 10E12/L (ref 4.4–5.9)
S PYO AG THROAT QL IA.RAPID: NEGATIVE
WBC # BLD AUTO: 8.1 10E9/L (ref 4–11)

## 2018-10-23 PROCEDURE — 99213 OFFICE O/P EST LOW 20 MIN: CPT | Mod: Z6 | Performed by: PHYSICIAN ASSISTANT

## 2018-10-23 PROCEDURE — 86308 HETEROPHILE ANTIBODY SCREEN: CPT | Performed by: PHYSICIAN ASSISTANT

## 2018-10-23 PROCEDURE — 87880 STREP A ASSAY W/OPTIC: CPT | Performed by: PHYSICIAN ASSISTANT

## 2018-10-23 PROCEDURE — 87081 CULTURE SCREEN ONLY: CPT | Performed by: PHYSICIAN ASSISTANT

## 2018-10-23 PROCEDURE — G0463 HOSPITAL OUTPT CLINIC VISIT: HCPCS | Performed by: PHYSICIAN ASSISTANT

## 2018-10-23 PROCEDURE — 85025 COMPLETE CBC W/AUTO DIFF WBC: CPT | Performed by: PHYSICIAN ASSISTANT

## 2018-10-23 ASSESSMENT — ENCOUNTER SYMPTOMS: SORE THROAT: 1

## 2018-10-23 NOTE — ED AVS SNAPSHOT
[facility] Emergency Department    [address] [address]    Phone:  [phone]    Fax:  [phone]                                       Carlo Valdez   MRN: [id number]    Department:  [facility] Emergency Department   Date of Visit:  10/23/2018           Patient Information     Date Of Birth          1975        Your diagnoses for this visit were:     Throat pain x3 weeks.        You were seen by Chantel Muñoz PA-C.      Follow-up Information     Follow up with Zee Oneill, REINA CNP.    Specialty:  Nurse Practitioner - Family    Why:  As needed    Contact information:    [address]   [place] [address]  [phone]          Follow up with [facility] Emergency Department.    Specialty:  EMERGENCY MEDICINE    Why:  As needed, If symptoms worsen    Contact information:    [address] [id number] [phone]    Additional information:    The medical center is located at   [address] (between [address] and   [address] in [place], [place] north   of [place]).        Discharge Instructions       No medication indicated at this time; throat culture pending.     Patient advised to call for any lab results (if obtained during visit) within 2-3 days.     Symptomatic treatment with fluids, rest, salt water gargles, and cool humidifier.  May use acetaminophen, ibuprofen as needed for pain. Can try over the counter throat lozenges or sprays also.    Patient given ENT information for further evaluation.    Patient to go to Emergency Room if drooling, change in voice, difficulty swallowing or talking, or persistent fevers occur.      Patient voiced understanding of instructions given.            24 Hour Appointment Hotline       To make an appointment at any [facility], call [phone] ([phone]). If you don't have a family doctor or clinic, we will help you find one. [facility] are conveniently  located to serve the needs of you and your family.          ED Discharge Orders     OTOLARYNGOLOGY REFERRAL       Your provider has referred you to: FMG: [facility] [phone]   [email or web address]    Please be aware that coverage of these services is subject to the terms and limitations of your health insurance plan.  Call member services at your health plan with any benefit or coverage questions.      Please bring the following with you to your appointment:    (1) Any X-Rays, CTs or MRIs which have been performed.  Contact the facility where they were done to arrange for  prior to your scheduled appointment.   (2) List of current medications  (3) This referral request   (4) Any documents/labs given to you for this referral                     Review of your medicines      Our records show that you are taking the medicines listed below. If these are incorrect, please call your family doctor or clinic.        Dose / Directions Last dose taken    cetirizine 10 MG tablet   Commonly known as:  zyrTEC   Dose:  10 mg   Quantity:  30 tablet        Take 1 tablet (10 mg) by mouth every evening   Refills:  1                Procedures and tests performed during your visit     CBC with platelets differential    Mononucleosis screen    Rapid strep group A screen POCT      Orders Needing Specimen Collection     None      Pending Results     No orders found from 10/21/2018 to 10/24/2018.            Pending Culture Results     No orders found from 10/21/2018 to 10/24/2018.            Pending Results Instructions     If you had any lab results that were not finalized at the time of your Discharge, you can call the ED Lab Result RN at [phone]. You will be contacted by this team for any positive Lab results or changes in treatment. The nurses are available 7 days a week from 10A to 6:30P.  You can leave a message 24 hours per day and they will return your call.        Test  Results From Your Hospital Stay        10/23/2018  7:11 PM      Component Results     Component Value Ref Range & Units Status    Rapid Strep A Screen negative neg Final    Internal QC OK Yes  Final         10/23/2018  8:17 PM      Component Results     Component Value Ref Range & Units Status    WBC 8.1 4.0 - 11.0 10e9/L Final    RBC Count 5.04 4.4 - 5.9 10e12/L Final    Hemoglobin 15.3 13.3 - 17.7 g/dL Final    Hematocrit 46.1 40.0 - 53.0 % Final    MCV 92 78 - 100 fl Final    MCH 30.4 26.5 - 33.0 pg Final    MCHC 33.2 31.5 - 36.5 g/dL Final    RDW 12.1 10.0 - 15.0 % Final    Platelet Count 208 150 - 450 10e9/L Final    Diff Method Automated Method  Final    % Neutrophils 57.5 % Final    % Lymphocytes 32.4 % Final    % Monocytes 7.9 % Final    % Eosinophils 1.5 % Final    % Basophils 0.5 % Final    % Immature Granulocytes 0.2 % Final    Nucleated RBCs 0 0 /100 Final    Absolute Neutrophil 4.7 1.6 - 8.3 10e9/L Final    Absolute Lymphocytes 2.6 0.8 - 5.3 10e9/L Final    Absolute Monocytes 0.6 0.0 - 1.3 10e9/L Final    Absolute Eosinophils 0.1 0.0 - 0.7 10e9/L Final    Absolute Basophils 0.0 0.0 - 0.2 10e9/L Final    Abs Immature Granulocytes 0.0 0 - 0.4 10e9/L Final    Absolute Nucleated RBC 0.0  Final         10/23/2018  8:16 PM      Component Results     Component Value Ref Range & Units Status    Mononucleosis Screen Negative NEG^Negative Final                Thank you for choosing [facility]       Thank you for choosing [facility] for your care. Our goal is always to provide you with excellent care. Hearing back from our patients is one way we can continue to improve our services. Please take a few minutes to complete the written survey that you may receive in the mail after you visit with us. Thank you!        [facility] Information     [employer] lets you send messages to your doctor, view your test results, renew your prescriptions, schedule appointments and more. To sign up, go to [email or web address] . Click on  "\"Log in\" on the left side of the screen, which will take you to the Welcome page. Then click on \"Sign up Now\" on the right side of the page.     You will be asked to enter the access code listed below, as well as some personal information. Please follow the directions to create your username and password.     Your access code is: [email or web address]  Expires: 2018 10:53 AM     Your access code will  in 90 days. If you need help or a new code, please call your [facility] clinic or [phone].        Care EveryWhere ID     This is your Care EveryWhere ID. This could be used by other organizations to access your [facility] medical records  [id number]        Equal Access to Services     SERGIO CRONIN : Hill Weir, warashid gonzales, naya gardner, gaviota de luna. So [facility] [phone].    ATENCIÓN: Si habla español, tiene a caceres disposición servicios gratuitos de asistencia lingüística. Llame al [phone].    We comply with applicable federal civil rights laws and [place] laws. We do not discriminate on the basis of race, color, national origin, age, disability, sex, sexual orientation, or gender identity.            After Visit Summary       This is your record. Keep this with you and show to your community pharmacist(s) and doctor(s) at your next visit.                  "

## 2018-10-23 NOTE — ED AVS SNAPSHOT
[facility] Emergency Department    [address] [address]    Phone:  [phone]    Fax:  [phone]                                       Carlo Valdez   MRN: [id number]    Department:  [facility] Emergency Department   Date of Visit:  10/23/2018           After Visit Summary Signature Page     I have received my discharge instructions, and my questions have been answered. I have discussed any challenges I see with this plan with the nurse or doctor.    ..........................................................................................................................................  Patient/Patient Representative Signature      ..........................................................................................................................................  Patient Representative Print Name and Relationship to Patient    ..................................................               ................................................  Date                                   Time    ..........................................................................................................................................  Reviewed by Signature/Title    ...................................................              ..............................................  Date                                               Time          22EPIC Rev 08/18

## 2018-10-24 NOTE — ED PROVIDER NOTES
History     Chief Complaint   Patient presents with     Pharyngitis     HPI    Carlo Valdez  is a 42 year old male who is here today because of: Sore Throat.  The patient has had symptoms of sore throat.   Onset of symptoms was almost 3 months ago. Course of illness is waxing and waning.  Patient denies exposure to illness at home or work/school.   Patient denies fever, cough, earache, nausea, vomiting, diarrhea, headache, facial pressure, sinus pain, decreased appetite, fatigue and abdominal pain.   Treatment measures tried include acetaminophen, ibuprofen.    Patient denies tobacco use, dysphagia, dysphonia, trismus.  Patient states that he originally noticed right tonsillar swelling that waxes and wanes, and mainly right-sided tenderness, but now the sore throat is bilateral.  Patient states he was seen on 9/21/18 with negative throat culture.  She is concerned because symptoms do not appear to be resolving.    Problem list, Medication list, Allergies, and Medical/Social/Surgical histories reviewed in EPIC and updated as appropriate.    Problem List:    Patient Active Problem List    Diagnosis Date Noted     HTN, goal below 140/90 09/21/2018     Priority: Medium     Health Care Home 03/25/2013     Priority: Medium     EMERGENCY CARE PLAN  March 25, 2013: No current Care Coordination follow up planned. Please refer if Care Coordination services are needed.    Presenting Problem Signs and Symptoms Treatment Plan   Questions or concerns   during clinic hours   I will call my clinic directly:  [address] [address]  [phone].   Questions or concerns outside clinic hours   I will call the 24 hour nurse line at   [phone] or [address] [place] [address].   Need to schedule an appointment   I will call the 24 hour scheduling team at [phone] or my clinic directly at [phone].    Same day treatment     I will call my clinic first, nurse line if after hours, urgent  care and express care if needed.   Clinic care coordination services (regular clinic hours)     I will call a clinic care coordinator directly:     Richard Cowan RN  Aria [place]en[place], Fri - [phone]  Wed, Thurs - [phone]    Seraethel Tejedajuliocesar, SW:    [phone]    Or call my clinic at [phone] and ask to speak with care coordination.   Crisis Services: Behavioral or Mental Health  C[facility]is Connection 24 Hour Phone Line  [phone]    [facility] 24 Hour Crisis Services  [phone]    [facility] [phone]    [facility]   [phone]       Emergency treatment -- Immediately    CAll 911   .    DX V65.8 REPLACED WITH [id number] [facility] (04/08/2013)       CARDIOVASCULAR SCREENING; LDL GOAL LESS THAN 160 03/17/2011     Priority: Medium        Past Medical History:    History reviewed. No pertinent past medical history.    Past Surgical History:    Past Surgical History:   Procedure Laterality Date     SURGICAL HISTORY OF -   2001    removal of lymph node from left groin       Family History:    Family History   Problem Relation Age of Onset     Neurologic Disorder Mother      parkinsons     C.A.D. Father      MI age 71-passed from MI       Social History:  Marital Status:   [2]  Social History   Substance Use Topics     Smoking status: Never Smoker     Smokeless tobacco: Never Used     Alcohol use 0.0 oz/week     0 Standard drinks or equivalent per week      Comment: 1-2 drinks per month        Medications:      cetirizine (ZYRTEC) 10 MG tablet         Review of Systems   HENT: Positive for sore throat.    All other systems reviewed and are negative.      Physical Exam   BP: (!) 168/102  Heart Rate: 75  Temp: 97.8  F (36.6  C)  Resp: 18  Height: 182.9 cm (6')  Weight: 119.3 kg (263 lb)  SpO2: 98 %      Physical Exam     BP (!) 168/102  Temp 97.8  F (36.6  C) (Temporal)  Resp 18  Ht 1.829 m (6')  Wt 119.3 kg (263 lb)  SpO2 98%  BMI 35.67  kg/m2  General: healthy, alert with no acute distress, and non toxic in appearance  Eyes - conjunctivae clear.  Ears - External ears normal. Canals clear. TM's normal.  Nose/Sinuses - Nares normal.Mucosa normal. No drainage or sinus tenderness.  Oropharynx - Lips, mucosa, and tongue normal. Minimal oropharyngeal erythema. No tonsillar hypertrophy or exudates present, dysphonia, dysphasia, trismus, hoarse voice noted.  Neck - Neck supple; no anterior cervical lymph nodes.   Lungs - Lungs clear; no wheezing or rales.  Heart - regular rate and rhythm. No murmurs, rub.  Abdomen: Abdomen soft, non-tender. BS normal. No masses, organomegaly  SKIN: no suspicious lesions or rashes    Labs:  Rapid Strep test is negative; await throat culture results.  Results for orders placed or performed during the hospital encounter of 10/23/18 (from the past 24 hour(s))   Rapid strep group A screen POCT   Result Value Ref Range    Rapid Strep A Screen negative neg    Internal QC OK Yes    Beta strep group A r/o culture   Result Value Ref Range    Specimen Description Throat     Special Requests Specimen collected in eSwab transport (white cap)     Culture Micro PENDING    CBC with platelets differential   Result Value Ref Range    WBC 8.1 4.0 - 11.0 10e9/L    RBC Count 5.04 4.4 - 5.9 10e12/L    Hemoglobin 15.3 13.3 - 17.7 g/dL    Hematocrit 46.1 40.0 - 53.0 %    MCV 92 78 - 100 fl    MCH 30.4 26.5 - 33.0 pg    MCHC 33.2 31.5 - 36.5 g/dL    RDW 12.1 10.0 - 15.0 %    Platelet Count 208 150 - 450 10e9/L    Diff Method Automated Method     % Neutrophils 57.5 %    % Lymphocytes 32.4 %    % Monocytes 7.9 %    % Eosinophils 1.5 %    % Basophils 0.5 %    % Immature Granulocytes 0.2 %    Nucleated RBCs 0 0 /100    Absolute Neutrophil 4.7 1.6 - 8.3 10e9/L    Absolute Lymphocytes 2.6 0.8 - 5.3 10e9/L    Absolute Monocytes 0.6 0.0 - 1.3 10e9/L    Absolute Eosinophils 0.1 0.0 - 0.7 10e9/L    Absolute Basophils 0.0 0.0 - 0.2 10e9/L    Abs Immature  Granulocytes 0.0 0 - 0.4 10e9/L    Absolute Nucleated RBC 0.0    Mononucleosis screen   Result Value Ref Range    Mononucleosis Screen Negative NEG^Negative           ED Course     ED Course     Procedures              Critical Care time:  none               Results for orders placed or performed during the hospital encounter of 10/23/18 (from the past 24 hour(s))   Rapid strep group A screen POCT   Result Value Ref Range    Rapid Strep A Screen negative neg    Internal QC OK Yes    Beta strep group A r/o culture   Result Value Ref Range    Specimen Description Throat     Special Requests Specimen collected in eSwab transport (white cap)     Culture Micro PENDING    CBC with platelets differential   Result Value Ref Range    WBC 8.1 4.0 - 11.0 10e9/L    RBC Count 5.04 4.4 - 5.9 10e12/L    Hemoglobin 15.3 13.3 - 17.7 g/dL    Hematocrit 46.1 40.0 - 53.0 %    MCV 92 78 - 100 fl    MCH 30.4 26.5 - 33.0 pg    MCHC 33.2 31.5 - 36.5 g/dL    RDW 12.1 10.0 - 15.0 %    Platelet Count 208 150 - 450 10e9/L    Diff Method Automated Method     % Neutrophils 57.5 %    % Lymphocytes 32.4 %    % Monocytes 7.9 %    % Eosinophils 1.5 %    % Basophils 0.5 %    % Immature Granulocytes 0.2 %    Nucleated RBCs 0 0 /100    Absolute Neutrophil 4.7 1.6 - 8.3 10e9/L    Absolute Lymphocytes 2.6 0.8 - 5.3 10e9/L    Absolute Monocytes 0.6 0.0 - 1.3 10e9/L    Absolute Eosinophils 0.1 0.0 - 0.7 10e9/L    Absolute Basophils 0.0 0.0 - 0.2 10e9/L    Abs Immature Granulocytes 0.0 0 - 0.4 10e9/L    Absolute Nucleated RBC 0.0    Mononucleosis screen   Result Value Ref Range    Mononucleosis Screen Negative NEG^Negative       Medications - No data to display    Assessments & Plan (with Medical Decision Making)     I have reviewed the nursing notes.    I have reviewed the findings, diagnosis, plan and need for follow up with the patient.   2-year-old male presents to the urgent care with almost 3 history of waxing and waning sore throat.  Patient here  today because he is concerned that this has not resolved.  Rapid strep was obtained and was negative in office, CBC and mono were also obtained and were within normal limits.  On exam mild erythema noted to the posterior pharynx, but no tonsillar swelling or exudate.  No dysphonia, dysphasia, or trismus noted. Uvula was midline.  Exam does not appear to be consistent with solar abscess, Luis Daniel's angina, posterior pharynx cellulitis, or mono.  Patient does have a history of tobacco use.  Patient given information to follow-up with ENT for further evaluation and possible scope.  Agrees with plan at time of discharge.  Culture currently but    Discharge Medication List as of 10/23/2018  8:26 PM          Final diagnoses:   Throat pain - x3 weeks.        10/23/2018   [facility] EMERGENCY DEPARTMENT     Chantel Muñoz PA-C  10/23/18 4112     90

## 2018-10-24 NOTE — DISCHARGE INSTRUCTIONS
No medication indicated at this time; throat culture pending.     Patient advised to call for any lab results (if obtained during visit) within 2-3 days.     Symptomatic treatment with fluids, rest, salt water gargles, and cool humidifier.  May use acetaminophen, ibuprofen as needed for pain. Can try over the counter throat lozenges or sprays also.    Patient given ENT information for further evaluation.    Patient to go to Emergency Room if drooling, change in voice, difficulty swallowing or talking, or persistent fevers occur.      Patient voiced understanding of instructions given.

## 2018-10-25 LAB
BACTERIA SPEC CULT: NORMAL
Lab: NORMAL
SPECIMEN SOURCE: NORMAL

## 2018-10-31 ENCOUNTER — OFFICE VISIT (OUTPATIENT)
Dept: OTOLARYNGOLOGY | Facility: CLINIC | Age: 43
End: 2018-10-31
Payer: COMMERCIAL

## 2018-10-31 VITALS
DIASTOLIC BLOOD PRESSURE: 98 MMHG | BODY MASS INDEX: 33.91 KG/M2 | TEMPERATURE: 97.8 F | WEIGHT: 250 LBS | HEART RATE: 85 BPM | SYSTOLIC BLOOD PRESSURE: 152 MMHG

## 2018-10-31 DIAGNOSIS — R07.0 THROAT PAIN: Primary | ICD-10-CM

## 2018-10-31 DIAGNOSIS — J35.01 CHRONIC TONSILLITIS: ICD-10-CM

## 2018-10-31 PROCEDURE — 99204 OFFICE O/P NEW MOD 45 MIN: CPT | Performed by: OTOLARYNGOLOGY

## 2018-10-31 ASSESSMENT — PAIN SCALES - GENERAL: PAINLEVEL: SEVERE PAIN (7)

## 2018-10-31 NOTE — MR AVS SNAPSHOT
After Visit Summary   10/31/2018    Carlo Valdez    MRN: [id number]           Patient Information     Date Of Birth          1975        Visit Information        Provider Department      10/31/2018 3:15 PM Sanya De MD [facility]        Today's Diagnoses     Throat pain    -  1    Chronic tonsillitis          Care Instructions    Per physician's instructions            Follow-ups after your visit        Who to contact     If you have questions or need follow up information about today's clinic visit or your schedule please contact [facility] directly at [phone].  Normal or non-critical lab and imaging results will be communicated to you by MyChart, letter or phone within 4 business days after the clinic has received the results. If you do not hear from us within 7 days, please contact the clinic through MyChart or phone. If you have a critical or abnormal lab result, we will notify you by phone as soon as possible.  Submit refill requests through [employer] or call your pharmacy and they will forward the refill request to us. Please allow 3 business days for your refill to be completed.          Additional Information About Your Visit        Care EveryWhere ID     This is your Care EveryWhere ID. This could be used by other organizations to access your [facility] medical records  [id number]        Your Vitals Were     Pulse Temperature BMI (Body Mass Index)             85 97.8  F (36.6  C) (Oral) 33.91 kg/m2          Blood Pressure from Last 3 Encounters:   10/31/18 (!) 152/98   10/23/18 (!) 168/102   09/21/18 (!) 148/110    Weight from Last 3 Encounters:   10/31/18 113.4 kg (250 lb)   10/23/18 119.3 kg (263 lb)   09/21/18 118.8 kg (262 lb)              Today, you had the following     No orders found for display       Primary Care Provider Office Phone # Fax #    REINA [facility] [phone] [address] [place]  [address]        Equal Access to Services     [facility] Medical CenterJCARLOS : Hadii aad ku hadkevinrichard Jenalejandro, warikada danniefaviolaha, qaeugenejonnathan valentinojoleengaviota brown. So [facility] [phone].    ATENCIÓN: Si habla español, tiene a caceres disposición servicios gratuitos de asistencia lingüística. Llame al [phone].    We comply with applicable federal civil rights laws and [place] laws. We do not discriminate on the basis of race, color, national origin, age, disability, sex, sexual orientation, or gender identity.            Thank you!     Thank you for choosing [facility]  for your care. Our goal is always to provide you with excellent care. Hearing back from our patients is one way we can continue to improve our services. Please take a few minutes to complete the written survey that you may receive in the mail after your visit with us. Thank you!             Your Updated Medication List - Protect others around you: Learn how to safely use, store and throw away your medicines at www.disposemymeds.org.          This list is accurate as of 10/31/18  4:40 PM.  Always use your most recent med list.                   Brand Name Dispense Instructions for use Diagnosis    cetirizine 10 MG tablet    zyrTEC    30 tablet    Take 1 tablet (10 mg) by mouth every evening    Tonsillitis       VITAMIN C PO      Take 1,000 mg by mouth 2 times daily

## 2018-10-31 NOTE — NURSING NOTE
Initial BP (!) 152/98 (BP Location: Right arm, Patient Position: Chair, Cuff Size: Adult Large)  Pulse 85  Temp 97.8  F (36.6  C) (Oral)  Wt 113.4 kg (250 lb)  BMI 33.91 kg/m2 Estimated body mass index is 33.91 kg/(m^2) as calculated from the following:    Height as of 10/23/18: 1.829 m (6').    Weight as of this encounter: 113.4 kg (250 lb). .    Armida Lee LPN

## 2018-10-31 NOTE — PROGRESS NOTES
History of Present Illness - Carlo Valdez is a 42 year old male who presents with intermittent throat discomfort. Strep test on 10/23/18 was negative. He initially had swelling of the right tonsil for 2 months, but this has recently resolved. He does report bilateral upper throat pain. He denies trouble swallowing. He endorses halitosis.    Past Medical History -   Patient Active Problem List   Diagnosis     CARDIOVASCULAR SCREENING; LDL GOAL LESS THAN 160     Health Care Home     HTN, goal below 140/90       Current Medications -   Current Outpatient Prescriptions:      Ascorbic Acid (VITAMIN C PO), Take 1,000 mg by mouth 2 times daily, Disp: , Rfl:      cetirizine (ZYRTEC) 10 MG tablet, Take 1 tablet (10 mg) by mouth every evening (Patient not taking: Reported on 10/31/2018), Disp: 30 tablet, Rfl: 1    Allergies -   Allergies   Allergen Reactions     Penicillins        Social History -   Social History     Social History     Marital status:      Spouse name: N/A     Number of children: N/A     Years of education: N/A     Social History Main Topics     Smoking status: Never Smoker     Smokeless tobacco: Never Used     Alcohol use 0.0 oz/week     0 Standard drinks or equivalent per week      Comment: 1-2 drinks per month     Drug use: No     Sexual activity: Yes     Partners: Female     Other Topics Concern     Not on file     Social History Narrative       Family History -   Family History   Problem Relation Age of Onset     Neurologic Disorder Mother      parkinsons     C.A.D. Father      [place] age 71-passed from [place]       Review of Systems - As per HPI and PMHx, otherwise 7 system review of the head and neck negative. 10+ system review negative.    Physical Exam  BP (!) 152/98 (BP Location: Right arm, Patient Position: Chair, Cuff Size: Adult Large)  Pulse 85  Temp 97.8  F (36.6  C) (Oral)  Wt 113.4 kg (250 lb)  BMI 33.91 kg/m2  General - The patient is well nourished and well developed, and appears  to have good nutritional status.  Alert and oriented to person and place, answers questions and cooperates with examination appropriately.   Head and Face - Normocephalic and atraumatic, with no gross asymmetry noted of the contour of the facial features.  The facial nerve is intact, with strong symmetric movements.  Voice and Breathing - The patient was breathing comfortably without the use of accessory muscles. There was no wheezing, stridor, or stertor.  The patients voice was clear and strong, and had appropriate pitch and quality.  Ears - Bilateral pinna and EACs with normal appearing overlying skin. Tympanic membrane intact with good mobility on pneumatic otoscopy bilaterally. Bony landmarks of the ossicular chain are normal. The tympanic membranes are normal in appearance. No retraction, perforation, or masses.  No fluid or purulence was seen in the external canal or the middle ear.   Eyes - Extraocular movements intact.  Sclera were not icteric or injected, conjunctiva were pink and moist.  Mouth - Examination of the oral cavity showed pink, healthy oral mucosa. No lesions or ulcerations noted.  The tongue was mobile and midline, and the dentition were in good condition.    Throat - The walls of the oropharynx were smooth, pink, moist, symmetric, and had no lesions or ulcerations.  The tonsillar pillars and soft palate were symmetric.  The uvula was midline on elevation.  Neck - Normal midline excursion of the laryngotracheal complex during swallowing.  Full range of motion on passive movement.  Palpation of the occipital, submental, submandibular, internal jugular chain, and supraclavicular nodes did not demonstrate any abnormal lymph nodes or masses.  The carotid pulse was palpable bilaterally.  Palpation of the thyroid was soft and smooth, with no nodules or goiter appreciated.  The trachea was mobile and midline.  Nose - External contour is symmetric, no gross deflection or scars.  Nasal mucosa is pink  and moist with no abnormal mucus.  The septum was midline and non-obstructive, turbinates of normal size and position.  No polyps, masses, or purulence noted on examination.  Heart:  Regular rate and rhythm, no murmurs.  Lungs:  Chest clear to auscultation bilaterally    Assessment - Carlo Valdez is a 42 year old male with chronic tonsillitis. Based on the physical exam and history, my recommendation is for tonsillectomy (without adenoidectomy).  The remainder of the visit was spent discussing the risks and benefits of tonsillectomy.  These included:  The risks of general anesthesia, bleeding, infection, possible need for emergency surgery to control bleeding, possible alteration of speech and swallowing, and even the possibility of continued throat problems following surgery.  They understood and wished to call in and schedule.     Patient to follow up with Primary Care provider regarding elevated blood pressure.      Dr. Sanya De MD  Otolaryngology  [facility]

## 2018-10-31 NOTE — LETTER
10/31/2018         RE: Carlo Valdez  [address]  [facility] [address]        Dear Colleague,    Thank you for referring your patient, Carlo Valdez, to the [facility]. Please see a copy of my visit note below.        History of Present Illness - Carlo Valdez is a 42 year old male who presents with intermittent throat discomfort. Strep test on 10/23/18 was negative. He initially had swelling of the right tonsil for 2 months, but this has recently resolved. He does report bilateral upper throat pain. He denies trouble swallowing. He endorses halitosis.    Past Medical History -   Patient Active Problem List   Diagnosis     CARDIOVASCULAR SCREENING; LDL GOAL LESS THAN 160     Health Care Home     HTN, goal below 140/90       Current Medications -   Current Outpatient Prescriptions:      Ascorbic Acid (VITAMIN C PO), Take 1,000 mg by mouth 2 times daily, Disp: , Rfl:      cetirizine (ZYRTEC) 10 MG tablet, Take 1 tablet (10 mg) by mouth every evening (Patient not taking: Reported on 10/31/2018), Disp: 30 tablet, Rfl: 1    Allergies -   Allergies   Allergen Reactions     Penicillins        Social History -   Social History     Social History     Marital status:      Spouse name: N/A     Number of children: N/A     Years of education: N/A     Social History Main Topics     Smoking status: Never Smoker     Smokeless tobacco: Never Used     Alcohol use 0.0 oz/week     0 Standard drinks or equivalent per week      Comment: 1-2 drinks per month     Drug use: No     Sexual activity: Yes     Partners: Female     Other Topics Concern     Not on file     Social History Narrative       Family History -   Family History   Problem Relation Age of Onset     Neurologic Disorder Mother      parkinsons     C.A.D. Father      [place] age 71-passed from [place]       Review of Systems - As per HPI and PMHx, otherwise 7 system review of the head and neck negative. 10+ system review negative.    Physical  Exam  BP (!) 152/98 (BP Location: Right arm, Patient Position: Chair, Cuff Size: Adult Large)  Pulse 85  Temp 97.8  F (36.6  C) (Oral)  Wt 113.4 kg (250 lb)  BMI 33.91 kg/m2  General - The patient is well nourished and well developed, and appears to have good nutritional status.  Alert and oriented to person and place, answers questions and cooperates with examination appropriately.   Head and Face - Normocephalic and atraumatic, with no gross asymmetry noted of the contour of the facial features.  The facial nerve is intact, with strong symmetric movements.  Voice and Breathing - The patient was breathing comfortably without the use of accessory muscles. There was no wheezing, stridor, or stertor.  The patients voice was clear and strong, and had appropriate pitch and quality.  Ears - Bilateral pinna and EACs with normal appearing overlying skin. Tympanic membrane intact with good mobility on pneumatic otoscopy bilaterally. Bony landmarks of the ossicular chain are normal. The tympanic membranes are normal in appearance. No retraction, perforation, or masses.  No fluid or purulence was seen in the external canal or the middle ear.   Eyes - Extraocular movements intact.  Sclera were not icteric or injected, conjunctiva were pink and moist.  Mouth - Examination of the oral cavity showed pink, healthy oral mucosa. No lesions or ulcerations noted.  The tongue was mobile and midline, and the dentition were in good condition.    Throat - The walls of the oropharynx were smooth, pink, moist, symmetric, and had no lesions or ulcerations.  The tonsillar pillars and soft palate were symmetric.  The uvula was midline on elevation.  Neck - Normal midline excursion of the laryngotracheal complex during swallowing.  Full range of motion on passive movement.  Palpation of the occipital, submental, submandibular, internal jugular chain, and supraclavicular nodes did not demonstrate any abnormal lymph nodes or masses.  The  carotid pulse was palpable bilaterally.  Palpation of the thyroid was soft and smooth, with no nodules or goiter appreciated.  The trachea was mobile and midline.  Nose - External contour is symmetric, no gross deflection or scars.  Nasal mucosa is pink and moist with no abnormal mucus.  The septum was midline and non-obstructive, turbinates of normal size and position.  No polyps, masses, or purulence noted on examination.  Heart:  Regular rate and rhythm, no murmurs.  Lungs:  Chest clear to auscultation bilaterally    Assessment - Carlo Valdez is a 42 year old male with chronic tonsillitis. Based on the physical exam and history, my recommendation is for tonsillectomy (without adenoidectomy).  The remainder of the visit was spent discussing the risks and benefits of tonsillectomy.  These included:  The risks of general anesthesia, bleeding, infection, possible need for emergency surgery to control bleeding, possible alteration of speech and swallowing, and even the possibility of continued throat problems following surgery.  They understood and wished to call in and schedule.     Patient to follow up with Primary Care provider regarding elevated blood pressure.      Dr. Sanya De MD  Otolaryngology  [facility] ([place], [place])        Again, thank you for allowing me to participate in the care of your patient.        Sincerely,        Sanya De MD

## 2018-12-17 ENCOUNTER — TELEPHONE (OUTPATIENT)
Dept: OTOLARYNGOLOGY | Facility: CLINIC | Age: 43
End: 2018-12-17

## 2018-12-17 NOTE — TELEPHONE ENCOUNTER
Patient advised that the only day available for surgery is 12-19-18.  Will call us back if able to do this.  Also advised that a pre-op will be needed.  Clover Culp CMA

## 2018-12-18 NOTE — TELEPHONE ENCOUNTER
Patient never called back for surgery tomorrow so will close encounter.   When they call back we can schedule sometime in 2019.  Clover Culp CMA

## 2019-12-09 ENCOUNTER — OFFICE VISIT (OUTPATIENT)
Dept: FAMILY MEDICINE | Facility: CLINIC | Age: 44
End: 2019-12-09
Payer: COMMERCIAL

## 2019-12-09 VITALS
TEMPERATURE: 97.1 F | SYSTOLIC BLOOD PRESSURE: 120 MMHG | HEIGHT: 72 IN | WEIGHT: 227.6 LBS | HEART RATE: 84 BPM | DIASTOLIC BLOOD PRESSURE: 88 MMHG | RESPIRATION RATE: 18 BRPM | OXYGEN SATURATION: 96 % | BODY MASS INDEX: 30.83 KG/M2

## 2019-12-09 DIAGNOSIS — H10.32 ACUTE BACTERIAL CONJUNCTIVITIS OF LEFT EYE: ICD-10-CM

## 2019-12-09 DIAGNOSIS — J11.1 INFLUENZA-LIKE ILLNESS: ICD-10-CM

## 2019-12-09 DIAGNOSIS — R07.0 THROAT PAIN: Primary | ICD-10-CM

## 2019-12-09 LAB
DEPRECATED S PYO AG THROAT QL EIA: NORMAL
SPECIMEN SOURCE: NORMAL

## 2019-12-09 PROCEDURE — 87880 STREP A ASSAY W/OPTIC: CPT | Performed by: NURSE PRACTITIONER

## 2019-12-09 PROCEDURE — 87081 CULTURE SCREEN ONLY: CPT | Performed by: NURSE PRACTITIONER

## 2019-12-09 PROCEDURE — 99213 OFFICE O/P EST LOW 20 MIN: CPT | Performed by: NURSE PRACTITIONER

## 2019-12-09 RX ORDER — POLYMYXIN B SULFATE AND TRIMETHOPRIM 1; 10000 MG/ML; [USP'U]/ML
SOLUTION OPHTHALMIC
Qty: 1 BOTTLE | Refills: 1 | Status: SHIPPED | OUTPATIENT
Start: 2019-12-09

## 2019-12-09 ASSESSMENT — ENCOUNTER SYMPTOMS
WHEEZING: 0
SORE THROAT: 1
MYALGIAS: 1
VOMITING: 0
COUGH: 1
SINUS PRESSURE: 1
EYE ITCHING: 1
DIAPHORESIS: 0
DIARRHEA: 0
NAUSEA: 0
FEVER: 1
EYE DISCHARGE: 1
FATIGUE: 1
HEADACHES: 0
SHORTNESS OF BREATH: 0
CHILLS: 1
SINUS PAIN: 1
RHINORRHEA: 0

## 2019-12-09 ASSESSMENT — PAIN SCALES - GENERAL: PAINLEVEL: NO PAIN (0)

## 2019-12-09 ASSESSMENT — MIFFLIN-ST. JEOR: SCORE: 1952.45

## 2019-12-09 NOTE — PATIENT INSTRUCTIONS
Start over the counter  Claritin, Zyrtec, or Allegra     May use over the counter Flonase per package directions.     These are general instructions and may not be specific to you. Please call, email or follow up if you have any questions or concerns.         Patient Education     Bacterial Conjunctivitis    You have an infection in the membranes covering the white part of the eye. This part of the eye is called the conjunctiva. The infection is called conjunctivitis. The most common symptoms of conjunctivitis include a thick, pus-like discharge from the eye, swollen eyelids, redness, eyelids sticking together upon awakening, and a gritty or scratchy feeling in the eye. Your infection was caused by bacteria. It may be treated with medicine. With treatment, the infection takes about 7 to 10 days to resolve.  Home care    Use prescribed antibiotic eye drops or ointment as directed to treat the infection.    Apply a warm compress (towel soaked in warm water) to the affected eye 3 to 4 times a day. Do this just before applying medicine to the eye.    Use a warm, wet cloth to wipe away crusting of the eyelids in the morning. This is caused by mucus drainage during the night. You may also use saline irrigating solution or artificial tears to rinse away mucus in the eye. Do not put a patch over the eye.    Wash your hands before and after touching the infected eye. This is to prevent spreading the infection to the other eye, and to other people. Don't share your towels or washcloths with others.    You may use acetaminophen or ibuprofen to control pain, unless another medicine was prescribed. (Note: If you have chronic liver or kidney disease or have ever had a stomach ulcer or gastrointestinal bleeding, talk with your doctor before using these medicines.)    Don't wear contact lenses until your eyes have healed and all symptoms are gone.  Follow-up care  Follow up with your healthcare provider, or as advised.  When to  seek medical advice  Call your healthcare provider right away if any of these occur:    Worsening vision    Increasing pain in the eye    Increasing swelling or redness of the eyelid    Redness spreading around the eye  Date Last Reviewed: 7/1/2017 2000-2018 [employer]. [address] [place] [address], [place] [address]. All rights reserved. This information is not intended as a substitute for professional medical care. Always follow your healthcare professional's instructions.

## 2019-12-09 NOTE — PROGRESS NOTES
"Subjective     Carlo Valdez is a 43 year old male who presents to clinic today for the following health issues:    HPI     ENT Symptoms             Symptoms: cc Present Absent Comment   Fever/Chills x x  100 tympanic this AM   Fatigue  x     Muscle Aches  x     Eye Irritation  x  Left eye red with drainage   Sneezing  x     Nasal Chetan/Drg   x    Sinus Pressure/Pain  x     Loss of smell  x  Loss of smell and taste   Dental pain   x    Sore Throat  x     Swollen Glands  x     Ear Pain/Fullness   x    Cough  x  Mostly dry cough   Wheeze   x    Chest Pain   x    Shortness of breath   x    Rash   x    Other   x      Symptom duration:  4 days   Symptom severity:  moderate   Treatments tried:  Dayquil   Contacts:  daughter with pneumonia     Has not been feeling well for the last 4 days. Fever that started on Friday. Temp up to 102.5 on Sat and Sunday. Cough that is dry. Eye drainage that started last night. Taking some dayquil and that has helped some with cough. Cough is not keeping up at night.      Current Outpatient Medications   Medication Sig Dispense Refill     Ascorbic Acid (VITAMIN C PO) Take 1,000 mg by mouth 2 times daily       trimethoprim-polymyxin b (POLYTRIM) 12897-0.1 UNIT/ML-% ophthalmic solution Apply 1 drop in left eye three times per day. Use for 2 days after the redness is gone. Typically 5-7 days total. 1 Bottle 1     Allergies   Allergen Reactions     Penicillins        Reviewed and updated as needed this visit by Provider               Objective    /88 (BP Location: Right arm, Patient Position: Sitting, Cuff Size: Adult Large)   Pulse 84   Temp 97.1  F (36.2  C) (Tympanic)   Resp 18   Ht 1.816 m (5' 11.5\")   Wt 103.2 kg (227 lb 9.6 oz)   SpO2 96%   BMI 31.30 kg/m    Body mass index is 31.3 kg/m .          Assessment & Plan     Review of Systems   Constitutional: Positive for chills, fatigue and fever. Negative for diaphoresis.   HENT: Positive for sinus pressure, sinus pain, sneezing " and sore throat. Negative for congestion, ear pain and rhinorrhea.    Eyes: Positive for discharge and itching.   Respiratory: Positive for cough (dry). Negative for shortness of breath and wheezing.    Cardiovascular: Negative for chest pain.   Gastrointestinal: Negative for diarrhea, nausea and vomiting.   Musculoskeletal: Positive for myalgias.   Neurological: Negative for headaches.       Physical Exam  Constitutional:       Appearance: He is well-developed.   HENT:      Head: Normocephalic and atraumatic.      Right Ear: Tympanic membrane and external ear normal. No middle ear effusion. Tympanic membrane is not erythematous.      Left Ear: Tympanic membrane and external ear normal.  No middle ear effusion. Tympanic membrane is not erythematous.      Nose: No mucosal edema or rhinorrhea.   Eyes:      Conjunctiva/sclera:      Left eye: Left conjunctiva is injected.   Cardiovascular:      Rate and Rhythm: Normal rate and regular rhythm.      Heart sounds: Normal heart sounds.   Pulmonary:      Effort: Pulmonary effort is normal.      Breath sounds: Normal breath sounds. No wheezing.   Abdominal:      General: Bowel sounds are normal.      Palpations: Abdomen is soft.   Skin:     General: Skin is warm and dry.   Neurological:      Mental Status: He is alert.         1. Throat pain  - Rapid strep screen  - Beta strep group A culture    2. Acute bacterial conjunctivitis of left eye  Use warm washcloth to clean discharge from eyes. Apply ointment or drops as prescribed until clear of matter for 48 hours.   Discussed contagiousness  Enc good handwashing  - trimethoprim-polymyxin b (POLYTRIM) 28854-3.1 UNIT/ML-% ophthalmic solution; Apply 1 drop in left eye three times per day. Use for 2 days after the redness is gone. Typically 5-7 days total.  Dispense: 1 Bottle; Refill: 1    3. Influenza-like illness  Off work until 24 hours fever free  Rest  Push fluids  Tylenol or ibuprofen as needed  Educated regarding length of  illness, normally 7-10 days  Follow up if start feeling any worse.       Return in about 1 week (around 12/16/2019), or if symptoms worsen or fail to improve.    [facility]

## 2019-12-10 LAB
BACTERIA SPEC CULT: NORMAL
SPECIMEN SOURCE: NORMAL

## 2024-10-29 ENCOUNTER — OFFICE VISIT (OUTPATIENT)
Dept: FAMILY MEDICINE | Facility: CLINIC | Age: 49
End: 2024-10-29
Payer: COMMERCIAL

## 2024-10-29 VITALS
HEIGHT: 71 IN | OXYGEN SATURATION: 98 % | DIASTOLIC BLOOD PRESSURE: 94 MMHG | BODY MASS INDEX: 33.18 KG/M2 | RESPIRATION RATE: 16 BRPM | HEART RATE: 83 BPM | TEMPERATURE: 97.8 F | WEIGHT: 237 LBS | SYSTOLIC BLOOD PRESSURE: 128 MMHG

## 2024-10-29 DIAGNOSIS — K40.90 UNILATERAL INGUINAL HERNIA WITHOUT OBSTRUCTION OR GANGRENE, RECURRENCE NOT SPECIFIED: ICD-10-CM

## 2024-10-29 DIAGNOSIS — R03.0 ELEVATED BLOOD PRESSURE READING WITHOUT DIAGNOSIS OF HYPERTENSION: ICD-10-CM

## 2024-10-29 DIAGNOSIS — H90.3 ASYMMETRICAL SENSORINEURAL HEARING LOSS: Primary | ICD-10-CM

## 2024-10-29 PROCEDURE — 99213 OFFICE O/P EST LOW 20 MIN: CPT | Performed by: FAMILY MEDICINE

## 2024-10-29 ASSESSMENT — PAIN SCALES - GENERAL: PAINLEVEL_OUTOF10: NO PAIN (0)

## 2024-10-29 NOTE — PROGRESS NOTES
Carlo was seen today for ear problem, hernia and blood pressure recheck.    Diagnoses and all orders for this visit:    Asymmetrical sensorineural hearing loss  -     Adult Audiology  Referral; Future  -     Adult ENT  Referral; Future  -     recommend hearing evaluation. Recommend ENT    Unilateral inguinal hernia without obstruction or gangrene, recurrence not specified  -     Adult Gen Surg  Referral; Future    Elevated blood pressure reading without diagnosis of hypertension  -      Blood pressure noted to be high, cautioned patient about some of the complications of untreated blood pressure.   -      Asked to consider starting medication.            Subjective   Carlo is a 48 year old, presenting for the following health issues:  Ear Problem (Left ear feels plugged.  This has been for a couple of years.  Prior he was able to hear out of the ear.  Now there is a decrease with hearing.  States he is hearing at about 40-50-60%.  No pain in the ear.), Hernia (Possible hernia in the left groin area.), and Blood pressure recheck (Mentioned to him about the RN visit for a recheck on his blood pressure.  He declined the visit being made.  Doesn't check his blood pressure at home.  Mentioned to him that he could make a RN visit in the future if he changes his mind and wants this rechecked.)        10/29/2024    11:44 AM   Additional Questions   Roomed by Sole Nava CMA   Accompanied by Ashley-Spouse.     HPI     Patient is a 48-year-old male here for hearing difficulties.  His he reports that the last couple of years his left ear has gradually decreased in his ability to hear.  He denies any wax buildup no drainage.  Denies dizziness.  Denies headaches.  No trauma to the ears.  He reports no recurrent infections as a child.  No other symptoms related to the ear.      Other concerns today is left inguinal hernia he has had this for a year. it is a protrusion in the left inguinal area.   He is able to push the hernia back.  No pain reported.    Chief Complaint   Patient presents with    Ear Problem     Left ear feels plugged.  This has been for a couple of years.  Prior he was able to hear out of the ear.  Now there is a decrease with hearing.  States he is hearing at about 40-50-60%.  No pain in the ear.    Hernia     Possible hernia in the left groin area.    Blood pressure recheck     Mentioned to him about the RN visit for a recheck on his blood pressure.  He declined the visit being made.  Doesn't check his blood pressure at home.  Mentioned to him that he could make a RN visit in the future if he changes his mind and wants this rechecked.       Concern - Possible hernia  Onset: 1 1/2 years  Description: Possible hernia, left groin area.  Intensity: mild  Progression of Symptoms:  same  Accompanying Signs & Symptoms: Can have pain on occasion.    Previous history of similar problem: None  Precipitating factors:        Worsened by: States he will need to bend over before sneezing due to the hernia.  Alleviating factors:        Improved by: None  Therapies tried and outcome:  none             Review of Systems  CONSTITUTIONAL: NEGATIVE for fever, chills, change in weight  INTEGUMENTARY/SKIN: NEGATIVE for worrisome rashes, moles or lesions  ENT/MOUTH: POSITIVE for hearing loss left ear  RESP: NEGATIVE for significant cough or SOB  CV: NEGATIVE for chest pain, palpitations or peripheral edema  GI: NEGATIVE for nausea, abdominal pain, heartburn, or change in bowel habits  MUSCULOSKELETAL: NEGATIVE for significant arthralgias or myalgia  NEURO: NEGATIVE for weakness, dizziness or paresthesias  ENDOCRINE: NEGATIVE for temperature intolerance, skin/hair changes  HEME/ALLERGY/IMMUNE: NEGATIVE for bleeding problems  PSYCHIATRIC: NEGATIVE for changes in mood or affect      Objective    BP (!) 128/94 (BP Location: Left arm, Patient Position: Chair, Cuff Size: Adult Large)   Pulse 83   Temp 97.8  F (36.6  " C) (Tympanic)   Resp 16   Ht 1.803 m (5' 11\")   Wt 107.5 kg (237 lb)   SpO2 98%   BMI 33.05 kg/m    Body mass index is 33.05 kg/m .  Physical Exam   GENERAL: alert and no distress  EYES: Eyes grossly normal to inspection, PERRL and conjunctivae and sclerae normal  HENT: ear canals and TM's normal, nose and mouth without ulcers or lesions  NECK: no adenopathy, no asymmetry, masses, or scars  RESP: lungs clear to auscultation - no rales, rhonchi or wheezes  CV: regular rate and rhythm, normal S1 S2, no S3 or S4, no murmur, click or rub, no peripheral edema  ABDOMEN: soft, nontender, without hepatosplenomegaly or masses, bowel sounds normal, and hernia left inguinal    (male): hernia   MS: no gross musculoskeletal defects noted, no edema            Signed Electronically by: Sirisha Stewart MD    "

## 2024-10-31 ENCOUNTER — OFFICE VISIT (OUTPATIENT)
Dept: SURGERY | Facility: CLINIC | Age: 49
End: 2024-10-31
Attending: FAMILY MEDICINE
Payer: COMMERCIAL

## 2024-10-31 VITALS
HEIGHT: 71 IN | BODY MASS INDEX: 33.18 KG/M2 | OXYGEN SATURATION: 97 % | HEART RATE: 80 BPM | TEMPERATURE: 97.8 F | DIASTOLIC BLOOD PRESSURE: 82 MMHG | WEIGHT: 237 LBS | SYSTOLIC BLOOD PRESSURE: 151 MMHG

## 2024-10-31 DIAGNOSIS — K40.90 UNILATERAL INGUINAL HERNIA WITHOUT OBSTRUCTION OR GANGRENE, RECURRENCE NOT SPECIFIED: ICD-10-CM

## 2024-10-31 PROCEDURE — 99243 OFF/OP CNSLTJ NEW/EST LOW 30: CPT | Performed by: SURGERY

## 2024-10-31 ASSESSMENT — PAIN SCALES - GENERAL: PAINLEVEL_OUTOF10: NO PAIN (0)

## 2024-10-31 NOTE — LETTER
"10/31/2024      Carlo Valdez  [address]  [place] [address]      Dear Colleague,    Thank you for referring your patient, Carlo Valdez, to the [facility]. Please see a copy of my visit note below.    Surgical Consultation/History and Physical  [facility]    Carlo is seen in consultation for Hernia, at the request of AFTAB ROSSI NP, REINA CNP (Inactive).    Chief Complaint:  Hernia    History of Present Illness: Carlo Valdez is a 48 year old male presents with Hernia.  Noted about 1.5 years ago.  He is able to reduce the hernia.  Denies nausea or vomiting.  He has never had a colonoscopy.  Denies blood in stool or change in stool size.  Denies prior hernia repair.      Patient Active Problem List   Diagnosis     CARDIOVASCULAR SCREENING; LDL GOAL LESS THAN 160     HTN, goal below 140/90     No past medical history on file.    Past Surgical History:   Procedure Laterality Date     SURGICAL HISTORY OF -   2001    removal of lymph node from left groin     Family History   Problem Relation Age of Onset     Neurologic Disorder Mother         parkinsons     C.A.D. Father         [place] age 71-passed from [place]     Social History     Tobacco Use     Smoking status: Never     Smokeless tobacco: Never   Substance Use Topics     Alcohol use: Yes     Alcohol/week: 0.0 standard drinks of alcohol     Comment: 1-2 drinks per month      History   Drug Use No     No current outpatient medications on file.     Allergies   Allergen Reactions     Pcn [Penicillins]      Review of Systems:   5 point ROS otherwise negative    Physical Exam:  BP (!) 151/82   Pulse 80   Temp 97.8  F (36.6  C) (Tympanic)   Ht 1.803 m (5' 11\")   Wt 107.5 kg (237 lb)   SpO2 97%   BMI 33.05 kg/m      Constitutional- No acute distress, well nourished, non-toxic  Eyes: Anicteric, no injection.  PERRL  ENT:  Normocephalic, atraumatic, Nose midline, moist mucus membranes  Neck - supple, no " LAD  Respiratory- Clear to auscultation bilaterally, good inspiratory effort  Cardiovascular - Heart RRR, no lift's, thrills, murmurs, rubs, or gallop.  No peripheral edema.  No clubbing.  Abdomen - Soft, non-tender, +BS, no hepatosplenomegaly, small umbilical hernia, no overlying skin changes, incarcerated  Groins - 2+ pulses bilaterally and no LAD, easily reducible large left inguinal hernia.  No right inguinal hernia  Neuro - No focal neuro deficits, Alert and oriented x 3  Psych: Appropriate mood and affect  Musculoskeletal: Normal gait, symmetric strength.  FROM upper and lower extremities.  Skin: Warm, Dry    Assessment:  1. Unilateral inguinal hernia without obstruction or gangrene, recurrence not specified      Plan:   Carlo Valdez presents with symptomatic left inguinal hernia. Symptoms  are  progressively worsening recently. The patient may benefit from open left inguinal hernia repair with mesh, and the indications, risks, benefits and alternatives to surgery were discussed in detail, and the patient understood the counseling offered and wishes to proceed as planned and outlined. Risks specifically discussed include bleeding, infection, seroma, need for additional treatment, chronic pain, mesh complications (erosion, infection), nontherapeutic intervention, recurrent hernia, potential need for mesh, potential need for temporary surgical drain, wound complication (such as dehiscence), and rare complications related to surgery and/or anesthesia such as venous thromboembolism and cardiorespiratory complications.    He has incidental, asymptomatic umbilical hernia.  We did review KRAMES guide and discussed merits of both open and robotic approaches, given size will proceed with open hernia repair.    Ricardo Avalos DO on 10/31/2024 at 2:33 PM      Again, thank you for allowing me to participate in the care of your patient.        Sincerely,        Ricardo Avalos DO

## 2024-10-31 NOTE — PROGRESS NOTES
"Surgical Consultation/History and Physical  [facility] General Surgery    Carlo is seen in consultation for Hernia, at the request of AFTAB ROSSI NP, REINA CNP (Inactive).    Chief Complaint:  Hernia    History of Present Illness: Carlo Valdez is a 48 year old male presents with Hernia.  Noted about 1.5 years ago.  He is able to reduce the hernia.  Denies nausea or vomiting.  He has never had a colonoscopy.  Denies blood in stool or change in stool size.  Denies prior hernia repair.      Patient Active Problem List   Diagnosis    CARDIOVASCULAR SCREENING; LDL GOAL LESS THAN 160    HTN, goal below 140/90     No past medical history on file.    Past Surgical History:   Procedure Laterality Date    SURGICAL HISTORY OF -   2001    removal of lymph node from left groin     Family History   Problem Relation Age of Onset    Neurologic Disorder Mother         parkinsons    C.A.D. Father         MI age 71-passed from [place]     Social History     Tobacco Use    Smoking status: Never    Smokeless tobacco: Never   Substance Use Topics    Alcohol use: Yes     Alcohol/week: 0.0 standard drinks of alcohol     Comment: 1-2 drinks per month      History   Drug Use No     No current outpatient medications on file.     Allergies   Allergen Reactions    Pcn [Penicillins]      Review of Systems:   5 point ROS otherwise negative    Physical Exam:  BP (!) 151/82   Pulse 80   Temp 97.8  F (36.6  C) (Tympanic)   Ht 1.803 m (5' 11\")   Wt 107.5 kg (237 lb)   SpO2 97%   BMI 33.05 kg/m      Constitutional- No acute distress, well nourished, non-toxic  Eyes: Anicteric, no injection.  PERRL  ENT:  Normocephalic, atraumatic, Nose midline, moist mucus membranes  Neck - supple, no LAD  Respiratory- Clear to auscultation bilaterally, good inspiratory effort  Cardiovascular - Heart RRR, no lift's, thrills, murmurs, rubs, or gallop.  No peripheral edema.  No clubbing.  Abdomen - Soft, non-tender, +BS, no hepatosplenomegaly, small umbilical " hernia, no overlying skin changes, incarcerated  Groins - 2+ pulses bilaterally and no LAD, easily reducible large left inguinal hernia.  No right inguinal hernia  Neuro - No focal neuro deficits, Alert and oriented x 3  Psych: Appropriate mood and affect  Musculoskeletal: Normal gait, symmetric strength.  FROM upper and lower extremities.  Skin: Warm, Dry    Assessment:  1. Unilateral inguinal hernia without obstruction or gangrene, recurrence not specified      Plan:   Carlo Valdez presents with symptomatic left inguinal hernia. Symptoms  are  progressively worsening recently. The patient may benefit from open left inguinal hernia repair with mesh, and the indications, risks, benefits and alternatives to surgery were discussed in detail, and the patient understood the counseling offered and wishes to proceed as planned and outlined. Risks specifically discussed include bleeding, infection, seroma, need for additional treatment, chronic pain, mesh complications (erosion, infection), nontherapeutic intervention, recurrent hernia, potential need for mesh, potential need for temporary surgical drain, wound complication (such as dehiscence), and rare complications related to surgery and/or anesthesia such as venous thromboembolism and cardiorespiratory complications.    He has incidental, asymptomatic umbilical hernia.  We did review KRAMES guide and discussed merits of both open and robotic approaches, given size will proceed with open hernia repair.    Ricardo Avalos, DO on 10/31/2024 at 2:33 PM

## 2024-11-01 ENCOUNTER — TELEPHONE (OUTPATIENT)
Dept: SURGERY | Facility: CLINIC | Age: 49
End: 2024-11-01

## 2024-11-01 NOTE — TELEPHONE ENCOUNTER
Left message for patient to call us back to discuss surgery dates    Kelly Posada M.A.  Specialty surgery Scheduler

## 2024-11-12 NOTE — TELEPHONE ENCOUNTER
Type of surgery: HERNIORRHAPHY, INGUINAL, OPEN   Location of surgery: [place] [facility]  Date and time of surgery: 01/06/2025  Surgeon: KAMILA  Pre-Op Appt Date: 12/23/2024  Post-Op Appt Date: 01/21/2025   Packet sent out: Yes / MAIL   Pre-cert/Authorization completed:  No  Date:

## 2024-11-18 DIAGNOSIS — H90.6 MIXED CONDUCTIVE AND SENSORINEURAL HEARING LOSS OF BOTH EARS: Primary | ICD-10-CM

## 2024-11-21 ENCOUNTER — OFFICE VISIT (OUTPATIENT)
Dept: AUDIOLOGY | Facility: CLINIC | Age: 49
End: 2024-11-21
Payer: COMMERCIAL

## 2024-11-21 DIAGNOSIS — H90.6 MIXED CONDUCTIVE AND SENSORINEURAL HEARING LOSS OF BOTH EARS: ICD-10-CM

## 2024-11-21 DIAGNOSIS — H90.3 ASYMMETRICAL SENSORINEURAL HEARING LOSS: ICD-10-CM

## 2024-11-21 DIAGNOSIS — H90.42 SENSORINEURAL HEARING LOSS (SNHL) OF LEFT EAR WITH UNRESTRICTED HEARING OF RIGHT EAR: Primary | ICD-10-CM

## 2024-11-21 NOTE — PROGRESS NOTES
AUDIOLOGY REPORT    SUBJECTIVE: Carlo Valdez is a 48 year old male who was seen at the [facility] on 11/21/24 for audiologic evaluation, referred by, Sirisha Stewart MD. They were accompanied today by a family member. The patient has not been seen previously in this clinic. The patient reports aural fullness and decreased/muffled hearing in the left ear for the last 3 months. He notes increased tinnitus for a brief period of time over the last 3 months in the left ear. He denies hearing concerns in the right ear, frequent or bothersome tinnitus, otalgia, otorrhea, dizziness, and ear surgery. Carlo works in an [employer] shop and listens to podcasts, but does not wear hearing protection.      OBJECTIVE:  Abuse Screening:  Do you feel unsafe at home or work/school? No  Do you feel threatened by someone? No  Does anyone try to keep you from having contact with others, or doing things outside of your home? No  Physical signs of abuse present? No     Fall Risk Screen:  1. Have you fallen two or more times in the past year? No  2. Have you fallen and had an injury in the past year? No    Timed Up and Go Score (in seconds): Not tested  Is patient a fall risk?   Referral initiated: No  Fall Risk Assessment Completed by Audiology      Otoscopic exam indicates clear right canal, cerumen with moisture in the medial portion of the left canal     Pure Tone Thresholds assessed using conventional audiometry with good reliability from 250-8000 Hz bilaterally using insert earphones and circumaural headphones.     RIGHT:  Normal hearing    LEFT:    Borderline normal with 15 dB conductive components from 250-500 Hz rising to normal steeply sloping to moderate sensorineural hearing loss at 8 kHz    Tympanogram:    RIGHT: normal eardrum mobility    LEFT:   normal eardrum mobility    Reflexes (reported by stimulus ear): 1000 Hz  RIGHT: Ipsilateral is present at normal  levels  RIGHT: Contralateral is elevated at frequencies tested  LEFT:   Ipsilateral is present at normal levels  LEFT:   Contralateral is elevated at frequencies tested    Speech Reception Threshold:    RIGHT: 10 dB HL    LEFT:   20 dB HL    Word Recognition Score:     RIGHT: 100% at 60 dB HL using NU-6 recorded word list.    LEFT:   96% at 60 dB HL using NU-6 recorded word list.    Sophia: negative    ASSESSMENT: Today's results reveal sensorineural hearing loss in the left ear and normal hearing in the right ear. Today s results were discussed with the patient in detail.       PLAN: It is recommended that Carlo follow-up with an ENT to evaluate the asymmetric hearing loss and conductive components in the left ear. He should return as required for medical management, sooner if new concerns arise. Please call this clinic with questions regarding these results or recommendations.       Brett Garza, CCC-A  Clinical Audiologist  [place] #[id number]

## 2024-12-23 ENCOUNTER — OFFICE VISIT (OUTPATIENT)
Dept: FAMILY MEDICINE | Facility: CLINIC | Age: 49
End: 2024-12-23
Payer: COMMERCIAL

## 2024-12-23 VITALS
SYSTOLIC BLOOD PRESSURE: 130 MMHG | TEMPERATURE: 98.5 F | WEIGHT: 248 LBS | RESPIRATION RATE: 16 BRPM | HEIGHT: 71 IN | OXYGEN SATURATION: 96 % | BODY MASS INDEX: 34.72 KG/M2 | HEART RATE: 75 BPM | DIASTOLIC BLOOD PRESSURE: 88 MMHG

## 2024-12-23 DIAGNOSIS — Z86.79 HISTORY OF HYPERTENSION: ICD-10-CM

## 2024-12-23 DIAGNOSIS — K40.90 NON-RECURRENT UNILATERAL INGUINAL HERNIA WITHOUT OBSTRUCTION OR GANGRENE: ICD-10-CM

## 2024-12-23 DIAGNOSIS — Z01.818 PREOP GENERAL PHYSICAL EXAM: Primary | ICD-10-CM

## 2024-12-23 LAB
ANION GAP SERPL CALCULATED.3IONS-SCNC: 8 MMOL/L (ref 7–15)
BUN SERPL-MCNC: 14.3 MG/DL (ref 6–20)
CALCIUM SERPL-MCNC: 9.1 MG/DL (ref 8.8–10.4)
CHLORIDE SERPL-SCNC: 106 MMOL/L (ref 98–107)
CREAT SERPL-MCNC: 1.22 MG/DL (ref 0.67–1.17)
EGFRCR SERPLBLD CKD-EPI 2021: 73 ML/MIN/1.73M2
ERYTHROCYTE [DISTWIDTH] IN BLOOD BY AUTOMATED COUNT: 12 % (ref 10–15)
GLUCOSE SERPL-MCNC: 103 MG/DL (ref 70–99)
HCO3 SERPL-SCNC: 28 MMOL/L (ref 22–29)
HCT VFR BLD AUTO: 47.4 % (ref 40–53)
HGB BLD-MCNC: 16 G/DL (ref 13.3–17.7)
MCH RBC QN AUTO: 30.5 PG (ref 26.5–33)
MCHC RBC AUTO-ENTMCNC: 33.8 G/DL (ref 31.5–36.5)
MCV RBC AUTO: 91 FL (ref 78–100)
PLATELET # BLD AUTO: 204 10E3/UL (ref 150–450)
POTASSIUM SERPL-SCNC: 4.8 MMOL/L (ref 3.4–5.3)
RBC # BLD AUTO: 5.24 10E6/UL (ref 4.4–5.9)
SODIUM SERPL-SCNC: 142 MMOL/L (ref 135–145)
WBC # BLD AUTO: 6.5 10E3/UL (ref 4–11)

## 2024-12-23 PROCEDURE — 80048 BASIC METABOLIC PNL TOTAL CA: CPT | Performed by: NURSE PRACTITIONER

## 2024-12-23 PROCEDURE — 99214 OFFICE O/P EST MOD 30 MIN: CPT | Performed by: NURSE PRACTITIONER

## 2024-12-23 PROCEDURE — 36415 COLL VENOUS BLD VENIPUNCTURE: CPT | Performed by: NURSE PRACTITIONER

## 2024-12-23 PROCEDURE — 85027 COMPLETE CBC AUTOMATED: CPT | Performed by: NURSE PRACTITIONER

## 2024-12-23 NOTE — PROGRESS NOTES
Preoperative Evaluation  [facility]  [address] [address]  Phone: [phone]  Primary Provider: Physician No Ref-Primary  Pre-op Performing Provider: REINA Perez CNP  Dec 23, 2024           12/23/2024   Surgical Information   What procedure is being done? hernia surgery   Facility or Hospital where procedure/surgery will be performed: [facility]   Who is doing the procedure / surgery? not avaible   Date of surgery / procedure: january 6 2025   Time of surgery / procedure: no idea   Where do you plan to recover after surgery? at home with family     Fax number for surgical facility: Note does not need to be faxed, will be available electronically in Epic.    Assessment & Plan     The proposed surgical procedure is considered INTERMEDIATE risk.    Preop general physical exam    - BASIC METABOLIC PANEL; Future  - CBC with platelets; Future  - BASIC METABOLIC PANEL  - CBC with platelets    Non-recurrent unilateral inguinal hernia without obstruction or gangrene  -patient is cleared for surgery     History of hypertension  -BP well controlled   - BASIC METABOLIC PANEL; Future  - BASIC METABOLIC PANEL         - No identified additional risk factors other than previously addressed    Antiplatelet or Anticoagulation Medication Instructions   - Patient is on no antiplatelet or anticoagulation medications.    Additional Medication Instructions  Patient is on no additional chronic medications    Recommendation  Approval given to proceed with proposed procedure, without further diagnostic evaluation.    Phuong Matos is a 48 year old, presenting for the following:  Pre-Op Exam          12/23/2024     7:53 AM   Additional Questions   Roomed by jose francisco   Accompanied by self         12/23/2024     7:53 AM   Patient Reported Additional Medications   Patient reports taking the following new medications none     HPI related to upcoming procedure: left inguinal  hernia         12/23/2024   Pre-Op Questionnaire   Have you ever had a heart attack or stroke? No   Have you ever had surgery on your heart or blood vessels, such as a stent placement, a coronary artery bypass, or surgery on an artery in your head, neck, heart, or legs? No   Do you have chest pain with activity? No   Do you have a history of heart failure? No   Do you currently have a cold, bronchitis or symptoms of other infection? No   Do you have a cough, shortness of breath, or wheezing? No   Do you or anyone in your family have previous history of blood clots? No   Do you or does anyone in your family have a serious bleeding problem such as prolonged bleeding following surgeries or cuts? No   Have you ever had problems with anemia or been told to take iron pills? No   Have you had any abnormal blood loss such as black, tarry or bloody stools? No   Have you ever had a blood transfusion? No   Are you willing to have a blood transfusion if it is medically needed before, during, or after your surgery? Yes   Have you or any of your relatives ever had problems with anesthesia? No   Do you have sleep apnea, excessive snoring or daytime drowsiness? No   Do you have any artifical heart valves or other implanted medical devices like a pacemaker, defibrillator, or continuous glucose monitor? No   Do you have artificial joints? No   Are you allergic to latex? No       Preoperative Review of    reviewed - no record of controlled substances prescribed.      Patient Active Problem List    Diagnosis Date Noted    HTN, goal below 140/90 09/21/2018     Priority: Medium    CARDIOVASCULAR SCREENING; LDL GOAL LESS THAN 160 03/17/2011     Priority: Medium      No past medical history on file.  Past Surgical History:   Procedure Laterality Date    SURGICAL HISTORY OF -   2001    removal of lymph node from left groin     No current outpatient medications on file.       Allergies   Allergen Reactions    Pcn [Penicillins]      "    Social History     Tobacco Use    Smoking status: Never    Smokeless tobacco: Never   Substance Use Topics    Alcohol use: Yes     Alcohol/week: 0.0 standard drinks of alcohol     Comment: 1-2 drinks per month     History   Drug Use No             Review of Systems  Constitutional, HEENT, cardiovascular, pulmonary, gi and gu systems are negative, except as otherwise noted.    Objective    /88   Pulse 75   Temp 98.5  F (36.9  C) (Tympanic)   Resp 16   Ht 1.803 m (5' 11\")   Wt 112.5 kg (248 lb)   SpO2 96%   BMI 34.59 kg/m     Estimated body mass index is 34.59 kg/m  as calculated from the following:    Height as of this encounter: 1.803 m (5' 11\").    Weight as of this encounter: 112.5 kg (248 lb).  Physical Exam  GENERAL: alert and no distress  EYES: Eyes grossly normal to inspection, PERRL and conjunctivae and sclerae normal  HENT: normal cephalic/atraumatic  NECK: no adenopathy, no asymmetry, masses, or scars  RESP: lungs clear to auscultation - no rales, rhonchi or wheezes  CV: regular rate and rhythm, normal S1 S2, no S3 or S4, no murmur, click or rub, no peripheral edema   NEURO: Normal strength and tone, mentation intact and speech normal  PSYCH: mentation appears normal, affect normal/bright    No results for input(s): \"HGB\", \"PLT\", \"INR\", \"NA\", \"POTASSIUM\", \"CR\", \"A1C\" in the last 8760 hours.     Diagnostics  Labs pending at this time.  Results will be reviewed when available.   No EKG required, no history of coronary heart disease, significant arrhythmia, peripheral arterial disease or other structural heart disease.    Revised Cardiac Risk Index (RCRI)  The patient has the following serious cardiovascular risks for perioperative complications:   - No serious cardiac risks = 0 points     RCRI Interpretation: 0 points: Class I (very low risk - 0.4% complication rate)         Signed Electronically by: REINA Perez CNP  A copy of this evaluation report is provided to the requesting " physician.

## 2024-12-29 ENCOUNTER — HEALTH MAINTENANCE LETTER (OUTPATIENT)
Age: 49
End: 2024-12-29

## 2025-01-03 ENCOUNTER — ANESTHESIA EVENT (OUTPATIENT)
Dept: SURGERY | Facility: CLINIC | Age: 50
End: 2025-01-03
Payer: COMMERCIAL

## 2025-01-03 NOTE — ANESTHESIA PREPROCEDURE EVALUATION
Anesthesia Pre-Procedure Evaluation    Patient: Carlo Valdez   MRN: [id number] : 1975        Procedure : Procedure(s):  HERNIORRHAPHY, INGUINAL, OPEN          History reviewed. No pertinent past medical history.   Past Surgical History:   Procedure Laterality Date     SURGICAL HISTORY OF -       removal of lymph node from left groin      Allergies   Allergen Reactions     Pcn [Penicillins]       Social History     Tobacco Use     Smoking status: Never     Smokeless tobacco: Never   Substance Use Topics     Alcohol use: Yes     Alcohol/week: 0.0 standard drinks of alcohol     Comment: 1-2 drinks per month      Wt Readings from Last 1 Encounters:   24 112.5 kg (248 lb)        Anesthesia Evaluation   Pt has had prior anesthetic.         ROS/MED HX  ENT/Pulmonary:  - neg pulmonary ROS     Neurologic:  - neg neurologic ROS     Cardiovascular:     (+) Dyslipidemia hypertension- -   -  - -                                      METS/Exercise Tolerance:     Hematologic:  - neg hematologic  ROS     Musculoskeletal:   (+)  arthritis,             GI/Hepatic:  - neg GI/hepatic ROS     Renal/Genitourinary:  - neg Renal ROS     Endo:     (+)               Obesity,       Psychiatric/Substance Use:  - neg psychiatric ROS     Infectious Disease:  - neg infectious disease ROS     Malignancy:  - neg malignancy ROS     Other:  - neg other ROS          Physical Exam    Airway        Mallampati: II   TM distance: > 3 FB   Neck ROM: full   Mouth opening: > 3 cm    Respiratory Devices and Support         Dental       (+) Minor Abnormalities - some fillings, tiny chips      Cardiovascular   cardiovascular exam normal          Pulmonary   pulmonary exam normal            OUTSIDE LABS:  CBC:   Lab Results   Component Value Date    WBC 6.5 2024    WBC 8.1 10/23/2018    HGB 16.0 2024    HGB 15.3 10/23/2018    HCT 47.4 2024    HCT 46.1 10/23/2018     2024     10/23/2018     BMP:   Lab  "Results   Component Value Date     12/23/2024     07/26/2013    POTASSIUM 4.8 12/23/2024    POTASSIUM 4.9 07/26/2013    CHLORIDE 106 12/23/2024    CHLORIDE 103 07/26/2013    CO2 28 12/23/2024    CO2 28 07/26/2013    BUN 14.3 12/23/2024    BUN 23 07/26/2013    CR 1.22 (H) 12/23/2024    CR 1.10 07/26/2013     (H) 12/23/2024    GLC 79 07/26/2013     COAGS: No results found for: \"PTT\", \"INR\", \"FIBR\"  POC: No results found for: \"BGM\", \"HCG\", \"HCGS\"  HEPATIC:   Lab Results   Component Value Date    ALBUMIN 4.5 07/26/2013    PROTTOTAL 7.5 07/26/2013    ALT 31 07/26/2013    AST 25 07/26/2013    ALKPHOS 72 07/26/2013    BILITOTAL 0.5 07/26/2013     OTHER:   Lab Results   Component Value Date    CAIO 9.1 12/23/2024    CRP <5.0 07/26/2013    SED 4 07/26/2013       Anesthesia Plan    ASA Status:  2       Anesthesia Type: General.   Induction: Propofol.   Maintenance: TIVA.        Consents    Anesthesia Plan(s) and associated risks, benefits, and realistic alternatives discussed. Questions answered and patient/representative(s) expressed understanding.     - Discussed: Risks, Benefits and Alternatives for BOTH SEDATION and the PROCEDURE were discussed     - Discussed with:  Patient            Postoperative Care    Pain management: IV analgesics, Oral pain medications, Multi-modal analgesia.   PONV prophylaxis: Ondansetron (or other 5HT-3), Dexamethasone or Solumedrol     Comments:             REINA Bear CRNA    I have reviewed the pertinent notes and labs in the chart from the past 30 days and (re)examined the patient.  Any updates or changes from those notes are reflected in this note.               # Hypertension: Noted on problem list           # Obesity: Estimated body mass index is 34.59 kg/m  as calculated from the following:    Height as of 12/23/24: 1.803 m (5' 11\").    Weight as of 12/23/24: 112.5 kg (248 lb).             "

## 2025-01-06 ENCOUNTER — ANESTHESIA (OUTPATIENT)
Dept: SURGERY | Facility: CLINIC | Age: 50
End: 2025-01-06
Payer: COMMERCIAL

## 2025-01-06 ENCOUNTER — HOSPITAL ENCOUNTER (OUTPATIENT)
Facility: CLINIC | Age: 50
Discharge: HOME | End: 2025-01-06
Attending: SURGERY | Admitting: SURGERY
Payer: COMMERCIAL

## 2025-01-06 VITALS
DIASTOLIC BLOOD PRESSURE: 79 MMHG | HEIGHT: 71 IN | RESPIRATION RATE: 16 BRPM | TEMPERATURE: 97.9 F | BODY MASS INDEX: 34.72 KG/M2 | SYSTOLIC BLOOD PRESSURE: 132 MMHG | HEART RATE: 81 BPM | OXYGEN SATURATION: 96 % | WEIGHT: 248 LBS

## 2025-01-06 DIAGNOSIS — Z98.890 H/O LEFT INGUINAL HERNIA REPAIR: Primary | ICD-10-CM

## 2025-01-06 DIAGNOSIS — Z87.19 H/O LEFT INGUINAL HERNIA REPAIR: Primary | ICD-10-CM

## 2025-01-06 PROCEDURE — 88302 TISSUE EXAM BY PATHOLOGIST: CPT | Mod: 26 | Performed by: PATHOLOGY

## 2025-01-06 PROCEDURE — 999N000141 HC STATISTIC PRE-PROCEDURE NURSING ASSESSMENT: Performed by: SURGERY

## 2025-01-06 PROCEDURE — 250N000009 HC RX 250: Performed by: NURSE ANESTHETIST, CERTIFIED REGISTERED

## 2025-01-06 PROCEDURE — 49505 PRP I/HERN INIT REDUC >5 YR: CPT | Mod: LT | Performed by: SURGERY

## 2025-01-06 PROCEDURE — 250N000013 HC RX MED GY IP 250 OP 250 PS 637: Performed by: NURSE ANESTHETIST, CERTIFIED REGISTERED

## 2025-01-06 PROCEDURE — 370N000017 HC ANESTHESIA TECHNICAL FEE, PER MIN: Performed by: SURGERY

## 2025-01-06 PROCEDURE — 250N000011 HC RX IP 250 OP 636: Performed by: SURGERY

## 2025-01-06 PROCEDURE — C1781 MESH (IMPLANTABLE): HCPCS | Performed by: SURGERY

## 2025-01-06 PROCEDURE — 250N000011 HC RX IP 250 OP 636: Performed by: NURSE ANESTHETIST, CERTIFIED REGISTERED

## 2025-01-06 PROCEDURE — 710N000012 HC RECOVERY PHASE 2, PER MINUTE: Performed by: SURGERY

## 2025-01-06 PROCEDURE — 88302 TISSUE EXAM BY PATHOLOGIST: CPT | Mod: TC | Performed by: SURGERY

## 2025-01-06 PROCEDURE — 49592 RPR AA HRN 1ST < 3 NCR/STRN: CPT | Performed by: SURGERY

## 2025-01-06 PROCEDURE — 360N000075 HC SURGERY LEVEL 2, PER MIN: Performed by: SURGERY

## 2025-01-06 PROCEDURE — 271N000001 HC OR GENERAL SUPPLY NON-STERILE: Performed by: SURGERY

## 2025-01-06 PROCEDURE — 272N000001 HC OR GENERAL SUPPLY STERILE: Performed by: SURGERY

## 2025-01-06 PROCEDURE — 258N000003 HC RX IP 258 OP 636: Performed by: NURSE ANESTHETIST, CERTIFIED REGISTERED

## 2025-01-06 DEVICE — SELF-GRIPPING POLYESTER MESH,LEFT ANATOMICAL, POLYESTER WITH POLYLACTIC ACID GRIPS
Type: IMPLANTABLE DEVICE | Site: INGUINAL | Status: FUNCTIONAL
Brand: PROGRIP

## 2025-01-06 RX ORDER — CEFAZOLIN SODIUM/WATER 2 G/20 ML
2 SYRINGE (ML) INTRAVENOUS
Status: COMPLETED | OUTPATIENT
Start: 2025-01-06 | End: 2025-01-06

## 2025-01-06 RX ORDER — CEFAZOLIN SODIUM/WATER 2 G/20 ML
2 SYRINGE (ML) INTRAVENOUS SEE ADMIN INSTRUCTIONS
Status: DISCONTINUED | OUTPATIENT
Start: 2025-01-06 | End: 2025-01-06 | Stop reason: HOSPADM

## 2025-01-06 RX ORDER — DEXAMETHASONE SODIUM PHOSPHATE 4 MG/ML
4 INJECTION, SOLUTION INTRA-ARTICULAR; INTRALESIONAL; INTRAMUSCULAR; INTRAVENOUS; SOFT TISSUE
Status: DISCONTINUED | OUTPATIENT
Start: 2025-01-06 | End: 2025-01-06 | Stop reason: HOSPADM

## 2025-01-06 RX ORDER — DEXAMETHASONE SODIUM PHOSPHATE 4 MG/ML
INJECTION, SOLUTION INTRA-ARTICULAR; INTRALESIONAL; INTRAMUSCULAR; INTRAVENOUS; SOFT TISSUE PRN
Status: DISCONTINUED | OUTPATIENT
Start: 2025-01-06 | End: 2025-01-06

## 2025-01-06 RX ORDER — LIDOCAINE 40 MG/G
CREAM TOPICAL
Status: DISCONTINUED | OUTPATIENT
Start: 2025-01-06 | End: 2025-01-06 | Stop reason: HOSPADM

## 2025-01-06 RX ORDER — PROPOFOL 10 MG/ML
INJECTION, EMULSION INTRAVENOUS CONTINUOUS PRN
Status: DISCONTINUED | OUTPATIENT
Start: 2025-01-06 | End: 2025-01-06

## 2025-01-06 RX ORDER — BUPIVACAINE HYDROCHLORIDE 5 MG/ML
INJECTION, SOLUTION PERINEURAL PRN
Status: DISCONTINUED | OUTPATIENT
Start: 2025-01-06 | End: 2025-01-06 | Stop reason: HOSPADM

## 2025-01-06 RX ORDER — SODIUM CHLORIDE, SODIUM LACTATE, POTASSIUM CHLORIDE, CALCIUM CHLORIDE 600; 310; 30; 20 MG/100ML; MG/100ML; MG/100ML; MG/100ML
INJECTION, SOLUTION INTRAVENOUS CONTINUOUS
Status: DISCONTINUED | OUTPATIENT
Start: 2025-01-06 | End: 2025-01-06 | Stop reason: HOSPADM

## 2025-01-06 RX ORDER — DOCUSATE SODIUM 100 MG/1
100 CAPSULE, LIQUID FILLED ORAL 2 TIMES DAILY PRN
Qty: 24 CAPSULE | Refills: 0 | Status: SHIPPED | OUTPATIENT
Start: 2025-01-06

## 2025-01-06 RX ORDER — ONDANSETRON 2 MG/ML
4 INJECTION INTRAMUSCULAR; INTRAVENOUS EVERY 30 MIN PRN
Status: DISCONTINUED | OUTPATIENT
Start: 2025-01-06 | End: 2025-01-06 | Stop reason: HOSPADM

## 2025-01-06 RX ORDER — LIDOCAINE HYDROCHLORIDE AND EPINEPHRINE 10; 10 MG/ML; UG/ML
INJECTION, SOLUTION INFILTRATION; PERINEURAL PRN
Status: DISCONTINUED | OUTPATIENT
Start: 2025-01-06 | End: 2025-01-06 | Stop reason: HOSPADM

## 2025-01-06 RX ORDER — MAGNESIUM SULFATE HEPTAHYDRATE 40 MG/ML
INJECTION, SOLUTION INTRAVENOUS PRN
Status: DISCONTINUED | OUTPATIENT
Start: 2025-01-06 | End: 2025-01-06

## 2025-01-06 RX ORDER — OXYCODONE HYDROCHLORIDE 5 MG/1
5 TABLET ORAL
Status: DISCONTINUED | OUTPATIENT
Start: 2025-01-06 | End: 2025-01-06 | Stop reason: HOSPADM

## 2025-01-06 RX ORDER — HYDROXYZINE HYDROCHLORIDE 25 MG/1
25 TABLET, FILM COATED ORAL EVERY 6 HOURS PRN
Status: DISCONTINUED | OUTPATIENT
Start: 2025-01-06 | End: 2025-01-06 | Stop reason: HOSPADM

## 2025-01-06 RX ORDER — OXYCODONE HYDROCHLORIDE 5 MG/1
5 TABLET ORAL EVERY 6 HOURS PRN
Qty: 10 TABLET | Refills: 0 | Status: SHIPPED | OUTPATIENT
Start: 2025-01-06

## 2025-01-06 RX ORDER — ONDANSETRON 4 MG/1
4 TABLET, ORALLY DISINTEGRATING ORAL EVERY 30 MIN PRN
Status: DISCONTINUED | OUTPATIENT
Start: 2025-01-06 | End: 2025-01-06 | Stop reason: HOSPADM

## 2025-01-06 RX ORDER — FENTANYL CITRATE 50 UG/ML
INJECTION, SOLUTION INTRAMUSCULAR; INTRAVENOUS PRN
Status: DISCONTINUED | OUTPATIENT
Start: 2025-01-06 | End: 2025-01-06

## 2025-01-06 RX ORDER — KETOROLAC TROMETHAMINE 30 MG/ML
INJECTION, SOLUTION INTRAMUSCULAR; INTRAVENOUS PRN
Status: DISCONTINUED | OUTPATIENT
Start: 2025-01-06 | End: 2025-01-06

## 2025-01-06 RX ORDER — ACETAMINOPHEN 325 MG/1
975 TABLET ORAL ONCE
Status: COMPLETED | OUTPATIENT
Start: 2025-01-06 | End: 2025-01-06

## 2025-01-06 RX ORDER — HYDROXYZINE HYDROCHLORIDE 50 MG/1
50 TABLET, FILM COATED ORAL EVERY 6 HOURS PRN
Status: DISCONTINUED | OUTPATIENT
Start: 2025-01-06 | End: 2025-01-06 | Stop reason: HOSPADM

## 2025-01-06 RX ORDER — ACETAMINOPHEN 325 MG/1
650 TABLET ORAL
Status: DISCONTINUED | OUTPATIENT
Start: 2025-01-06 | End: 2025-01-06 | Stop reason: HOSPADM

## 2025-01-06 RX ORDER — NALOXONE HYDROCHLORIDE 0.4 MG/ML
0.1 INJECTION, SOLUTION INTRAMUSCULAR; INTRAVENOUS; SUBCUTANEOUS
Status: DISCONTINUED | OUTPATIENT
Start: 2025-01-06 | End: 2025-01-06 | Stop reason: HOSPADM

## 2025-01-06 RX ORDER — LIDOCAINE HYDROCHLORIDE 20 MG/ML
INJECTION, SOLUTION INFILTRATION; PERINEURAL PRN
Status: DISCONTINUED | OUTPATIENT
Start: 2025-01-06 | End: 2025-01-06

## 2025-01-06 RX ORDER — ONDANSETRON 2 MG/ML
INJECTION INTRAMUSCULAR; INTRAVENOUS PRN
Status: DISCONTINUED | OUTPATIENT
Start: 2025-01-06 | End: 2025-01-06

## 2025-01-06 RX ADMIN — SODIUM CHLORIDE, POTASSIUM CHLORIDE, SODIUM LACTATE AND CALCIUM CHLORIDE: 600; 310; 30; 20 INJECTION, SOLUTION INTRAVENOUS at 06:42

## 2025-01-06 RX ADMIN — ONDANSETRON 4 MG: 2 INJECTION INTRAMUSCULAR; INTRAVENOUS at 07:36

## 2025-01-06 RX ADMIN — LIDOCAINE HYDROCHLORIDE 100 MG: 20 INJECTION, SOLUTION INFILTRATION; PERINEURAL at 07:36

## 2025-01-06 RX ADMIN — MAGNESIUM SULFATE HEPTAHYDRATE 2 G: 40 INJECTION, SOLUTION INTRAVENOUS at 07:36

## 2025-01-06 RX ADMIN — DEXAMETHASONE SODIUM PHOSPHATE 4 MG: 4 INJECTION, SOLUTION INTRA-ARTICULAR; INTRALESIONAL; INTRAMUSCULAR; INTRAVENOUS; SOFT TISSUE at 07:36

## 2025-01-06 RX ADMIN — LIDOCAINE HYDROCHLORIDE 0.1 ML: 10 INJECTION, SOLUTION EPIDURAL; INFILTRATION; INTRACAUDAL; PERINEURAL at 06:43

## 2025-01-06 RX ADMIN — Medication 2 G: at 07:26

## 2025-01-06 RX ADMIN — FENTANYL CITRATE 100 MCG: 50 INJECTION INTRAMUSCULAR; INTRAVENOUS at 07:36

## 2025-01-06 RX ADMIN — HYDROMORPHONE HYDROCHLORIDE 0.5 MG: 1 INJECTION, SOLUTION INTRAMUSCULAR; INTRAVENOUS; SUBCUTANEOUS at 08:43

## 2025-01-06 RX ADMIN — MIDAZOLAM 2 MG: 1 INJECTION INTRAMUSCULAR; INTRAVENOUS at 07:33

## 2025-01-06 RX ADMIN — PROPOFOL 200 MCG/KG/MIN: 10 INJECTION, EMULSION INTRAVENOUS at 07:36

## 2025-01-06 RX ADMIN — ACETAMINOPHEN 975 MG: 325 TABLET, FILM COATED ORAL at 06:23

## 2025-01-06 RX ADMIN — KETOROLAC TROMETHAMINE 15 MG: 30 INJECTION, SOLUTION INTRAMUSCULAR at 07:36

## 2025-01-06 ASSESSMENT — ACTIVITIES OF DAILY LIVING (ADL)
ADLS_ACUITY_SCORE: 32
ADLS_ACUITY_SCORE: 33
ADLS_ACUITY_SCORE: 32

## 2025-01-06 NOTE — OR NURSING
Pt ready for discharge. Pt reports pain managed and he will take first dose of oxy at home, if needed. All disharge papers reviewed and questions answered. Pt discharged with wife via w.c.

## 2025-01-06 NOTE — ANESTHESIA POSTPROCEDURE EVALUATION
Patient: Carlo Valdez    Procedure: Procedure(s):  HERNIORRHAPHY, INGUINAL, OPEN with mesh  Herniorrhaphy umbilical, primary       Anesthesia Type:  General    Note:  Disposition: Outpatient   Postop Pain Control: Uneventful            Sign Out: Well controlled pain   PONV: No   Neuro/Psych: Uneventful            Sign Out: Acceptable/Baseline neuro status   Airway/Respiratory: Uneventful            Sign Out: Acceptable/Baseline resp. status   CV/Hemodynamics: Uneventful            Sign Out: Acceptable CV status; No obvious hypovolemia; No obvious fluid overload   Other NRE: NONE   DID A NON-ROUTINE EVENT OCCUR? No           Last vitals:  Vitals Value Taken Time   /79 01/06/25 1045   Temp 36.6  C (97.9  F) 01/06/25 1045   Pulse 81 01/06/25 1000   Resp 16 01/06/25 1045   SpO2 96 % 01/06/25 1045       Electronically Signed By: REINA Preston CRNA  January 6, 2025  12:00 PM

## 2025-01-06 NOTE — OR NURSING
Pt remains very slow to wake after procedure. Pt is now trying bites of bannanna bread and drinking water. Pt denies nausea. Pt reports minor discomfort in abdomen. Ice applied. Abd binder in place. Incisions are open to air and WNL. Pt has call light. Wife sent to pharmacy to  discharge prescriptions.

## 2025-01-06 NOTE — DISCHARGE INSTRUCTIONS
HOME CARE FOLLOWING INGUINAL/FEMORAL HERNIA REPAIR      DIET:  No restrictions.  Increased fluid intake is recommended. While taking pain medications, increase dietary fiber or add a fiber supplementation like Metamucil or Citrucel to help prevent constipation - a possible side effect of pain medications.    NAUSEA:  If nauseated from the anesthetic/pain meds; rest in bed, get up cautiously with assistance, and drink clear liquids (juice, tea, broth).    ACTIVITY:  Light Activity -- you may immediately be up and about as tolerated.  Driving -- you may drive when comfortable and off narcotic pain medications.  Light Work -- resume when comfortable off pain medications.  (If you can drive, you probably can work.)  Strenuous Work/Activity -- limit lifting to 20 pounds for 3 weeks.  Active Sports (running, biking, etc.) -- cautiously resume after 4 weeks.    INCISIONAL CARE:  If you have a dressing in place, keep clean and dry for 48 hours; you may replace the gauze if it becomes soiled.  After 48 hours you may remove the dressing and shower.  Do not submerse incision in water for 1 week.  If you have a Dermabond dressing (a type of skin glue), you may shower immediately.  Sutures will absorb and need not be removed.  If present, leave the steri-strips (white paper tapes) in place for 14 days after surgery.  If present, leave Dermabond glue in place until it wears/flakes off.  Expect a variable amount of swelling/black and blue discoloration that may involve the penis/scrotum or labia.  Some numbness around the incision is common.  A lump/ridge under the incision is normal and will gradually resolve.    DISCOMFORT:  Local anesthetic placed at surgery should provide relief for 4-8 hours.  Begin taking pain pills before discomfort is severe.  Take the pain medication with some food, when possible, to minimize side effects.  Intermittent use of ice packs to the hernia repair site may help during the first 1-3 weeks after  surgery.  Expect gradual improvement.    Over-the-counter anti-inflammatory medications (i.e. Ibuprofen/Advil/Motrin or Naprosyn/Aleve) may be used per package instructions in addition to or while tapering off the narcotic pain medications to decrease swelling and sensitivity at the repair site.  DO NOT TAKE these Anti-inflammatory medications if your primary physician has advised against doing so, or if you have acid reflux, ulcer, or bleeding disorder, or take blood-thinner medications.  Call your primary physician or the surgery office if you have medication questions.    RETURN APPOINTMENT:  Schedule a follow-up visit 2-3 weeks post-op if one has not been scheduled for you already.  Office Phone:  [phone]     CONTACT US IF THE FOLLOWING DEVELOPS:   1. A fever that is above 101     2. If there is a large amount of drainage, bleeding, or swelling.   3. Severe pain that is not relieved by your prescription.   4. Drainage that is thick, cloudy, yellow, green or white.   5. Any other questions not answered by  Frequently Asked Questions  sheet.      FREQUENTLY ASKED QUESTIONS:    Q:  How should my incision look?    A:  Normally your incision will appear slightly swollen with light redness directly along the incision itself as it heals.  It may feel like a bump or ridge as the healing/scarring happens, and over time (3-4 months) this bump or ridge feeling should slowly go away.  In general, clear or pink watery drainage can be normal at first as your incision heals, but should decrease over time.    Q:  How do I know if my incision is infected?  A:  Look at your incision for signs of infection, like redness around the incision spreading to surrounding skin, or drainage of cloudy or foul-smelling drainage.  If you feel warm, check your temperature to see if you are running a fever.    **If any of these things occur, please notify the nurse at our office.  We may need you to come into the office for an incision  check.      Q:  How do I take care of my incision?  A:  If you have a dressing in place - Starting the day after surgery, replace the dressing 1-2 times a day until there is no further drainage from the incision.  At that time, a dressing is no longer needed.  Try to minimize tape on the skin if irritation is occurring at the tape sites.  If you have significant irritation from tape on the skin, please call the office to discuss other method of dressing your incision.    Small pieces of tape called  steri-strips  may be present directly overlying your incision; these may be removed 10 days after surgery unless otherwise specified by your surgeon.  If these tapes start to loosen at the ends, you may trim them back until they fall off or are removed.    A:  If you had  Dermabond  tissue glue used as a dressing (this causes your incision to look shiny with a clear covering over it) - This type of dressing wears off with time and does not require more dressings over the top unless it is draining around the glue as it wears off.  Do not apply ointments or lotions over the incisions until the glue has completely worn off.    Q:  There is a piece of tape or a sticky  lead  still on my skin.  Can I remove this?  A:  Sometimes the sticky  leads  used for monitoring during surgery or for evaluation in the emergency department are not all removed while you are in the hospital.  These sometimes have a tab or metal dot on them.  You can easily remove these on your own, like taking off a band-aid.  If there is a gel substance under the  lead , simply wipe/clean it off with a washcloth or paper towel.      Q:  What can I do to minimize constipation (very hard stools, or lack of stools)?  A:  Stay well hydrated.  Increase your dietary fiber intake or take a fiber supplement -with plenty of water.  Walk around frequently.  You may consider an over-the-counter stool-softener.  Your Pharmacist can assist you with choosing one that is  stocked at your pharmacy.  Constipation is also one of the most common side effects of pain medication.  If you are using pain medication, be pro-active and try to PREVENT problems with constipation by taking the steps above BEFORE constipation becomes a problem.    Q:  What do I do if I need more pain medications?  A:  Call the office to receive refills.  Be aware that certain pain meds cannot be called into a pharmacy and actually require a paper prescription.  A change may be made in your pain med as you progress thru your recovery period or if you have side effects to certain meds.    --Pain meds are NOT refilled after 5pm on weekdays, and NOT AT ALL on the weekends, so please look ahead to prevent problems.      Q:  Why am I having a hard time sleeping now that I am at home?  A:  Many medications you receive while you are in the hospital can impact your sleep for a number of days after your surgery/hospitalization.  Decreased level of activity and naps during the day may also make sleeping at night difficult.  Try to minimize day-time naps, and get up frequently during the day to walk around your home during your recovery time.  Sleep aides may be of some help, but are not recommended for long-term use.      Q:  I am having some back discomfort.  What should I do?  A:  This may be related to certain positioning that was required for your surgery, extended periods of time in bed, or other changes in your overall activity level.  You may try ice, heat, acetaminophen, or ibuprofen to treat this temporarily.  Note that many pain medications have acetaminophen in them and would state this on the prescription bottle.  Be sure not to exceed the maximum of 4000mg per day of acetaminophen.     **If the pain you are having does not resolve, is severe, or is a flare of back pain you have had on other occasions prior to surgery, please contact your primary physician for further recommendations or for an appointment to be  examined at their office.    Q:  Why am I having headaches?  A:  Headaches can be caused by many things:  caffeine withdrawal, use of pain meds, dehydration, high blood pressure, lack of sleep, over-activity/exhaustion, flare-up of usual migraine headaches.  If you feel this is related to muscle tension (a band-like feeling around the head, or a pressure at the low-back of the head) you may try ice or heat to this area.  You may need to drink more fluids (try electrolyte drink like Gatorade), rest, or take your usual migraine medications.   **If your headaches do not resolve, worsen, are accompanied by other symptoms, or if your blood pressure is high, please call your primary physician for recommendation and/or examination.    Q:  I am unable to urinate.  What do I do?  A:  A small percentage of people can have difficulty urinating initially after surgery.  This includes being able to urinate only a very small amount at a time and feeling discomfort or pressure in the very low abdomen.  This is called  urinary retention , and is actually an urgent situation.  Proceed to your nearest Emergency department for evaluation (not an Urgent Care Center).  Sometimes the bladder does not work correctly after certain medications you receive during surgery, or related to certain procedures.  You may need to have a catheter placed until your bladder recovers.  When planning to go to an Emergency department, it may help to call the ER to let them know you are coming in for this problem after a surgery.  This may help you get in quicker to be evaluated.  **If you have symptoms of a urinary tract infection, please contact your primary physician for the proper evaluation and treatment.        If you have other questions, please call the office Monday thru Friday between 8am and 5pm to discuss with the nurse.  #[phone]    There is a surgeon ON CALL on weekday evenings and over the weekend in case of urgent need only, and may be  contacted at the same number.    If you are having an emergency, call 911 or proceed to your nearest emergency department.                               Same Day Surgery Discharge Instructions  Special Precautions After Surgery - Adult    It is not unusual to feel lightheaded or faint, up to 24 hours after surgery or while taking pain medication.  If you have these symptoms; sit for a few minutes before standing and have someone assist you when getting up.  You should rest and relax for the next 24 hours and must have someone stay with you for at least 24 hours after your discharge.  DO NOT DRIVE any vehicle or operate mechanical equipment for 24 hours following the end of your surgery.  DO NOT DRIVE while taking narcotic pain medications that have been prescribed by your physician.  If you had a limb operated on, you must be able to use it fully to drive.  DO NOT drink alcoholic beverages for 24 hours following surgery or while taking prescription pain medication.  Drink clear liquids (apple juice, ginger ale, broth, 7-Up, etc.).  Progress to your regular diet as you feel able.  Any questions call your physician and do not make important decisions for 24 hours.    Nausea and Vomiting: Nausea and vomiting can occur any time after receiving anesthesia. If you experience nausea and vomiting we encourage you to move to a clear liquid diet and advance your diet as tolerated. If nausea and vomiting do not improve within 12 hours please call the surgeon or present to the Emergency department.     Break-through Bleeding: If your experience bleeding from your surgical site apply pressure and additional dressing per nurse instruction. For simple problems such as a saturated dressing, you may need to reinforce the dressing with more gauze and tape and put slight pressure on the site. If bleeding does not subside contact the surgeon or present to the Emergency Department.    Post-op Infection: If you develop a fever of 100.4  or greater, have pus like drainage, redness, swelling or severe pain at the surgical site not alleviated with pain medications; please contact the surgeon or present to the Emergency Department.     Medications:  Acetaminophen (Tylenol):  Next dose: After 12:15 PM. Last dose given at 6:15 Am in pre-op.  Ibuprofen (Motrin, Advil):  Next dose: 1:30 PM. Dose given in OR at 7:30PM  Oxycodone 5mg:  Next dose: As needed for moderate pain. None given in SDS. .  Docusate sodium--stool softener: Begin tonight, follow MD instructions for constipation management.   Follow the instructions on the bottle.  __________________________________________________________________________________________________________________________________  IMPORTANT NUMBERS:    [facility] [place]:  [phone], [phone]  Pharmacy:  [phone]  Same Day Surgery:  [phone], for general post-op questions call Monday - Thursday until 8:30 p.m., Fridays until 6:00 p.m.   Mental Health Mobile Crisis line: [phone]                                                                      General Surgery:  [phone]    Specialty Access Center: [phone]

## 2025-01-06 NOTE — ANESTHESIA CARE TRANSFER NOTE
Patient: Carlo Valdez    Procedure: Procedure(s):  HERNIORRHAPHY, INGUINAL, OPEN with mesh  Herniorrhaphy umbilical, primary       Diagnosis: Unilateral inguinal hernia without obstruction or gangrene, recurrence not specified [K40.90]  Diagnosis Additional Information: No value filed.    Anesthesia Type:   General     Note:    Oropharynx: oropharynx clear of all foreign objects  Level of Consciousness: drowsy  Oxygen Supplementation: face mask    Independent Airway: airway patency satisfactory and stable  Dentition: dentition unchanged  Vital Signs Stable: post-procedure vital signs reviewed and stable  Report to RN Given: handoff report given  Patient transferred to: Phase II    Handoff Report: Identifed the Patient, Identified the Reponsible Provider, Reviewed the pertinent medical history, Discussed the surgical course, Reviewed Intra-OP anesthesia mangement and issues during anesthesia, Set expectations for post-procedure period and Allowed opportunity for questions and acknowledgement of understanding      Vitals:  Vitals Value Taken Time   /79 01/06/25 1045   Temp 36.6  C (97.9  F) 01/06/25 1045   Pulse 81 01/06/25 1000   Resp 16 01/06/25 1045   SpO2 96 % 01/06/25 1045       Electronically Signed By: REINA Preston CRNA  January 6, 2025  12:00 PM

## 2025-01-06 NOTE — PROGRESS NOTES
Reviewed umbilical hernia.  Small, incarcerated.  Patient would like repair.  Discussed will perform primary umbilical hernia repair in conjunction with open left inguinal.  All questions answered.    Ricardo Avalos, DO on 1/6/2025 at 7:24 AM

## 2025-01-06 NOTE — OP NOTE
Procedure Date: January 6, 2025    PREOPERATIVE DIAGNOSIS: 1. Reducible left inguinal hernia  2. Incarcerated umbilical hernia    POSTOPERATIVE DIAGNOSIS: Same    PROCEDURE:   Left Inguinal Hernia repair with implantation of mesh  Primary umbilical hernia      SURGEON and Assistants:   Surgeon(s):  Xi Sinha PA-C (needed for assistance with closure, retraction and suction)  Ricardo Avalos DO    ESTIMATED BLOOD LOSS: 30 mL    INDICATIONS: Mr. Carlo Valdez is a 49 year old  male who was seen and evaluated for worsening symptoms related to a left inguinal hernia and incarcerated umbilical hernia. The patient will benefit from elective hernia repair, and was counseled about the indications, risks, benefits and alternatives to surgery (including but not limited to risks of bleeding, infection, recurrent hernia, chronic pain, conversion to open surgery, cardiopulmonary complications, and hematoma), and consented to the aforementioned procedure.     INTRAOPERATIVE FINDINGS: There was a pantaloon inguinal hernia on the left and incarcerated omental fat containing umbilical hernia.     PROCEDURE: The patient was brought to the operating room and placed supine on the operating table. After administration of anesthesia, the abdominal wall and bilateral groins were prepped and draped in the standard fashion. Local anesthetic was delivered in preparation for incision, and as an ilioinguinal nerve block.  An oblique incision was made through the skin, and the subcutaneous tissue and Javed's fascia were dissected using electrocautery. The external oblique aponeurosis was incised along the line of its fibers and the incision extended medial to open the external ring. The ilioinguinal nerve was  identified and was protected from injury. The spermatic cord was then dissected along its course, and encircled with a penrose drain. The hernia sac was identified and dissected free from the spermatic cord. The sac was  entered at its apex, and once any contents were reduced appropriately to the intra-abdominal position, the sac was ligated at the base with 2-0 Vicryl suture. The sac was then transected and the closed portion allowed to return to an anatomic position. The distal/transected portion of sac was sent routine to pathology. A lipoma of the cord was ligated and excised.   The floor was examined and quite lax, suggesting a direct component to his hernia.  The transversalis fascia was approximated to the shelving edge of the inguinal ligament using interrupted vicryl sutures.    A Pro- mesh was secured first to the pubic tubercle with 0-0 Vicryl, and subsequently applied to the fascial reflections. The mesh was prefashioned to accommodate the spermatic cord, and the leaflets joined to one another, avoiding undue tension to the cord.  Hemostasis was assessed and appeared satisfactory. An instrument count, including laparotomy pads, sponges and needles was performed and found to be correct. The external oblique aponeurosis was reapproximated with a continuous 2-0 Vicryl suture. Javed's fascia and the superficial subcutaneous tissue was approximated with interrupted 3-0 Vicryl.      An incision was created at the supraumbilical midline and carried down to the fascia. The fascial defect was carefully dissected free of omentum, and the defect assessed. Findings on exploration include a 1.0 cm defect. The fascia was healthy and primary closure appeared appropriate. The fascia was then approximated primarily using interrupted 0-VIcryl suture and the repair assessed and found to be satisfactory. Hemostasis was assured. Sterile saline irrigant was used, and the solution suctioned free. An instrument count, including laparotomy pads, sponges and needles was performed and found to be correct.       Subcutaneous closure was accomplished in layers using 2-0 and 3-0 Vicryl suture, and skin edges were approximated using 4-0 Monocryl,  applied in a continuous subcuticular fashion. The skin was cleansed and dried, before administration of sterile glue.     The patient tolerated the procedure well, was allowed to recover from anesthesia. His testes were both within the scrotum at the end of the case, and he was transported to the recovery room in stable condition.     Ricardo Avalos DO on 1/6/2025 at 9:25 AM

## 2025-01-08 LAB
PATH REPORT.COMMENTS IMP SPEC: NORMAL
PATH REPORT.COMMENTS IMP SPEC: NORMAL
PATH REPORT.FINAL DX SPEC: NORMAL
PATH REPORT.GROSS SPEC: NORMAL
PATH REPORT.MICROSCOPIC SPEC OTHER STN: NORMAL
PATH REPORT.RELEVANT HX SPEC: NORMAL
PHOTO IMAGE: NORMAL

## 2025-01-11 ENCOUNTER — NURSE TRIAGE (OUTPATIENT)
Dept: NURSING | Facility: CLINIC | Age: 50
End: 2025-01-11
Payer: COMMERCIAL

## 2025-01-11 NOTE — TELEPHONE ENCOUNTER
Nurse Triage SBAR    Situation: Patient calling for triage.     Background: Pt had hernia surgery with Dr. Ricardo Avalos on Monday 01/06/2025 at [facility] [place].     Assessment:   Pt tells me he felt good up until last night. He began feeling run-down and fatigued. He then started vomiting. He has had 3-4 episodes of vomiting. He is also having diarrhea (3-4 episodes as well).     Temp 99.4F (tympanic)    He denies new/worsening pain, swelling.     Protocol Recommended Disposition: ED or PCP triage. Since pt just had surgery but is having new onset of vomiting/diarrhea (RN unsure if this is related to surgery), RN will page the provider on-call to discuss what to do.     RN called the [facility] and spoke with the answering service. The answering service sent a page to the on-call surgical provider, Dr. Kai Lozano, to call pt directly at [phone]. Advised pt that if he doesn't hear from the provider within 20 minutes, to call the answering service directly at [phone].     Patient verbalized understanding and had no further questions.      Patsy Russ RN  [place] Nurse Advisor  1/11/2025 10:58 AM     Reason for Disposition   [1] Vomiting AND [2] persists > 4 hours    Additional Information   Negative: Sounds like a life-threatening emergency to the triager   Negative: Chest pain   Negative: Difficulty breathing   Negative: Acting confused (e.g., disoriented, slurred speech) or excessively sleepy   Negative: Post-Op tonsil and adenoid surgery, symptoms or questions about   Negative: Surgical incision symptoms and questions   Negative: [1] Pain or burning with passing urine (urination) AND [2] male   Negative: [1] Pain or burning with passing urine (urination) AND [2] female   Negative: Constipation   Negative: New or worsening leg (calf, thigh) pain   Negative: New or worsening leg swelling   Negative: Dizziness is severe, or persists > 24 hours after surgery   Negative:  Pain, redness, swelling, or pus at IV Site   Negative: Symptoms arising from use of a urinary catheter (e.g., Coude, Ballard)   Negative: Cast problems or questions   Negative: Medication question   Negative: [1] Widespread rash AND [2] bright red, sunburn-like   Negative: [1] SEVERE headache AND [2] after spinal (epidural) anesthesia    Protocols used: Post-Op Symptoms and [id number]

## 2025-01-21 ENCOUNTER — OFFICE VISIT (OUTPATIENT)
Dept: SURGERY | Facility: CLINIC | Age: 50
End: 2025-01-21
Payer: COMMERCIAL

## 2025-01-21 VITALS
WEIGHT: 248 LBS | HEIGHT: 71 IN | SYSTOLIC BLOOD PRESSURE: 153 MMHG | TEMPERATURE: 98.3 F | DIASTOLIC BLOOD PRESSURE: 91 MMHG | BODY MASS INDEX: 34.72 KG/M2 | HEART RATE: 86 BPM

## 2025-01-21 DIAGNOSIS — Z98.890 H/O LEFT INGUINAL HERNIA REPAIR: Primary | ICD-10-CM

## 2025-01-21 DIAGNOSIS — Z87.19 H/O LEFT INGUINAL HERNIA REPAIR: Primary | ICD-10-CM

## 2025-01-21 PROCEDURE — 99024 POSTOP FOLLOW-UP VISIT: CPT | Performed by: SURGERY

## 2025-01-21 ASSESSMENT — PAIN SCALES - GENERAL: PAINLEVEL_OUTOF10: NO PAIN (0)

## 2025-01-21 NOTE — NURSING NOTE
"Initial BP (!) 153/91 (BP Location: Right arm, Patient Position: Sitting, Cuff Size: Adult Large)   Pulse 86   Temp 98.3  F (36.8  C) (Tympanic)   Ht 1.803 m (5' 11\")   Wt 112.5 kg (248 lb)   BMI 34.59 kg/m   Estimated body mass index is 34.59 kg/m  as calculated from the following:    Height as of this encounter: 1.803 m (5' 11\").    Weight as of this encounter: 112.5 kg (248 lb). .  Zeynep Lee MA    "

## 2025-01-21 NOTE — LETTER
"1/21/2025      Carlo Valdez  [address]  [facility] [address]      Dear Colleague,    Thank you for referring your patient, Carlo Valdez, to the [facility]. Please see a copy of my visit note below.    General Surgery Post Op    Pt returns for follow up visit s/p open left inguinal hernia and umbilical hernia repair on 1/6/2025.    Patient has been doing well, tolerating diet. Bowels moving well. Pain controlled. No issues with wound healing/redness/drainage. No fevers.      Physical exam: Vitals: BP (!) 153/91 (BP Location: Right arm, Patient Position: Sitting, Cuff Size: Adult Large)   Pulse 86   Temp 98.3  F (36.8  C) (Tympanic)   Ht 1.803 m (5' 11\")   Wt 112.5 kg (248 lb)   BMI 34.59 kg/m    BMI= Body mass index is 34.59 kg/m .    Exam:  Constitutional: healthy, alert, and no distress  Cardiovascular: negative  Respiratory: negative  Gastrointestinal: Abdomen soft, non-tender. BS normal. No masses, organomegaly  Incisions C/D/I.  Inguinal floor solid.      Path:  Final Diagnosis   Resected left inguinal hernia sac without evidence of malignancy       Assessment:     ICD-10-CM    1. H/O left inguinal hernia repair  Z98.890     Z87.19           Plan: Carlo Valdez was seen for follow-up after open left inguinal hernia repair with mesh and primary umbilical hernia repair.  Patient is doing well and recovering without issue at this time.  We reviewed the pathology which was benign.  We discussed routine post-operative care of wounds including avoiding sun exposure to wounds to limit scarring, no need for overlying ointments, and weight restrictions going forward.  Patient was instructed to call with any questions or concerns.  Carlo Valdez can follow-up on an as needed basis.        Ricardo Avalos, DO on 1/21/2025 at 8:12 AM        Again, thank you for allowing me to participate in the care of your patient.        Sincerely,        Ricardo Avalos, " DO    Electronically signed

## 2025-01-21 NOTE — PROGRESS NOTES
"General Surgery Post Op    Pt returns for follow up visit s/p open left inguinal hernia and umbilical hernia repair on 1/6/2025.    Patient has been doing well, tolerating diet. Bowels moving well. Pain controlled. No issues with wound healing/redness/drainage. No fevers.      Physical exam: Vitals: BP (!) 153/91 (BP Location: Right arm, Patient Position: Sitting, Cuff Size: Adult Large)   Pulse 86   Temp 98.3  F (36.8  C) (Tympanic)   Ht 1.803 m (5' 11\")   Wt 112.5 kg (248 lb)   BMI 34.59 kg/m    BMI= Body mass index is 34.59 kg/m .    Exam:  Constitutional: healthy, alert, and no distress  Cardiovascular: negative  Respiratory: negative  Gastrointestinal: Abdomen soft, non-tender. BS normal. No masses, organomegaly  Incisions C/D/I.  Inguinal floor solid.      Path:  Final Diagnosis   Resected left inguinal hernia sac without evidence of malignancy       Assessment:     ICD-10-CM    1. H/O left inguinal hernia repair  Z98.890     Z87.19           Plan: Carlo Valdez was seen for follow-up after open left inguinal hernia repair with mesh and primary umbilical hernia repair.  Patient is doing well and recovering without issue at this time.  We reviewed the pathology which was benign.  We discussed routine post-operative care of wounds including avoiding sun exposure to wounds to limit scarring, no need for overlying ointments, and weight restrictions going forward.  Patient was instructed to call with any questions or concerns.  Carlo Valdez can follow-up on an as needed basis.        Ricardo Avalos,  on 1/21/2025 at 8:12 AM      "

## 2025-08-13 ENCOUNTER — TELEPHONE (OUTPATIENT)
Dept: FAMILY MEDICINE | Facility: CLINIC | Age: 50
End: 2025-08-13
Payer: COMMERCIAL

## (undated) DEVICE — PACK LAPAROTOMY CUSTOM LAKES

## (undated) DEVICE — SU VICRYL 0 CT-2 CR 8X18" J727D

## (undated) DEVICE — DECANTER VIAL 2006S

## (undated) DEVICE — SU VICRYL 0 CT-2 27" UND J270H

## (undated) DEVICE — BLADE KNIFE SURG 15 371115

## (undated) DEVICE — PREP CHLORAPREP 26ML TINTED ORANGE  260815

## (undated) DEVICE — SUCTION MANIFOLD NEPTUNE 2 SYS 1 PORT 702-025-000

## (undated) DEVICE — SU VICRYL+ 3-0 27IN SH UND VCP416H

## (undated) DEVICE — STOCKING SLEEVE COMPRESSION CALF LG

## (undated) DEVICE — SPONGE KITTNER 31001010

## (undated) DEVICE — DRAIN PENROSE 3/4X1/2X18" LATEX 8888515205

## (undated) DEVICE — SU DERMABOND ADVANCED .7ML DNX12

## (undated) DEVICE — SU MONOCRYL 4-0 PS-2 18" UND Y496G

## (undated) DEVICE — GLOVE BIOGEL PI MICRO SZ 7.5 48575

## (undated) DEVICE — BLADE CLIPPER 4406

## (undated) DEVICE — GLOVE BIOGEL PI MICRO INDICATOR UNDERGLOVE SZ 8.0 48980

## (undated) DEVICE — GOWN XLG DISP 9545

## (undated) DEVICE — SU VICRYL 2-0 SH 27" UND J417H

## (undated) DEVICE — SYR BULB IRRIG 50ML LATEX FREE 0035280

## (undated) RX ORDER — PROPOFOL 10 MG/ML
INJECTION, EMULSION INTRAVENOUS
Status: DISPENSED
Start: 2025-01-06

## (undated) RX ORDER — LIDOCAINE HYDROCHLORIDE AND EPINEPHRINE 10; 10 MG/ML; UG/ML
INJECTION, SOLUTION INFILTRATION; PERINEURAL
Status: DISPENSED
Start: 2025-01-06

## (undated) RX ORDER — LIDOCAINE HYDROCHLORIDE 10 MG/ML
INJECTION, SOLUTION EPIDURAL; INFILTRATION; INTRACAUDAL; PERINEURAL
Status: DISPENSED
Start: 2025-01-06

## (undated) RX ORDER — BUPIVACAINE HYDROCHLORIDE 5 MG/ML
INJECTION, SOLUTION EPIDURAL; INTRACAUDAL
Status: DISPENSED
Start: 2025-01-06

## (undated) RX ORDER — CEFAZOLIN SODIUM/WATER 2 G/20 ML
SYRINGE (ML) INTRAVENOUS
Status: DISPENSED
Start: 2025-01-06

## (undated) RX ORDER — ACETAMINOPHEN 325 MG/1
TABLET ORAL
Status: DISPENSED
Start: 2025-01-06

## (undated) RX ORDER — KETOROLAC TROMETHAMINE 30 MG/ML
INJECTION, SOLUTION INTRAMUSCULAR; INTRAVENOUS
Status: DISPENSED
Start: 2025-01-06

## (undated) RX ORDER — FENTANYL CITRATE 50 UG/ML
INJECTION, SOLUTION INTRAMUSCULAR; INTRAVENOUS
Status: DISPENSED
Start: 2025-01-06

## (undated) RX ORDER — MAGNESIUM SULFATE HEPTAHYDRATE 40 MG/ML
INJECTION, SOLUTION INTRAVENOUS
Status: DISPENSED
Start: 2025-01-06

## (undated) RX ORDER — GLYCOPYRROLATE 0.2 MG/ML
INJECTION, SOLUTION INTRAMUSCULAR; INTRAVENOUS
Status: DISPENSED
Start: 2025-01-06